# Patient Record
Sex: FEMALE | Race: BLACK OR AFRICAN AMERICAN | NOT HISPANIC OR LATINO | Employment: FULL TIME | URBAN - METROPOLITAN AREA
[De-identification: names, ages, dates, MRNs, and addresses within clinical notes are randomized per-mention and may not be internally consistent; named-entity substitution may affect disease eponyms.]

---

## 2017-04-08 ENCOUNTER — HOSPITAL ENCOUNTER (EMERGENCY)
Facility: HOSPITAL | Age: 27
Discharge: HOME | End: 2017-04-08
Attending: EMERGENCY MEDICINE
Payer: MEDICAID

## 2017-04-08 VITALS
SYSTOLIC BLOOD PRESSURE: 136 MMHG | DIASTOLIC BLOOD PRESSURE: 89 MMHG | BODY MASS INDEX: 43.4 KG/M2 | TEMPERATURE: 99 F | WEIGHT: 293 LBS | OXYGEN SATURATION: 99 % | HEIGHT: 69 IN | RESPIRATION RATE: 18 BRPM | HEART RATE: 80 BPM

## 2017-04-08 DIAGNOSIS — R07.9 CHEST PAIN: ICD-10-CM

## 2017-04-08 DIAGNOSIS — F41.9 ANXIETY: Primary | ICD-10-CM

## 2017-04-08 DIAGNOSIS — R06.02 SOB (SHORTNESS OF BREATH): ICD-10-CM

## 2017-04-08 LAB
ALBUMIN SERPL BCP-MCNC: 3.6 G/DL
ALP SERPL-CCNC: 70 U/L
ALT SERPL W/O P-5'-P-CCNC: 11 U/L
ANION GAP SERPL CALC-SCNC: 10 MMOL/L
AST SERPL-CCNC: 19 U/L
B-HCG UR QL: NEGATIVE
BASOPHILS # BLD AUTO: 0.01 K/UL
BASOPHILS NFR BLD: 0.1 %
BILIRUB SERPL-MCNC: 0.2 MG/DL
BNP SERPL-MCNC: <10 PG/ML
BUN SERPL-MCNC: 11 MG/DL
CALCIUM SERPL-MCNC: 8.6 MG/DL
CHLORIDE SERPL-SCNC: 106 MMOL/L
CO2 SERPL-SCNC: 23 MMOL/L
CREAT SERPL-MCNC: 0.8 MG/DL
CTP QC/QA: YES
D DIMER PPP IA.FEU-MCNC: 0.56 MG/L FEU
DIFFERENTIAL METHOD: ABNORMAL
EOSINOPHIL # BLD AUTO: 0 K/UL
EOSINOPHIL NFR BLD: 0.3 %
ERYTHROCYTE [DISTWIDTH] IN BLOOD BY AUTOMATED COUNT: 16.4 %
EST. GFR  (AFRICAN AMERICAN): >60 ML/MIN/1.73 M^2
EST. GFR  (NON AFRICAN AMERICAN): >60 ML/MIN/1.73 M^2
GLUCOSE SERPL-MCNC: 101 MG/DL
HCT VFR BLD AUTO: 37.1 %
HGB BLD-MCNC: 11.5 G/DL
LYMPHOCYTES # BLD AUTO: 4 K/UL
LYMPHOCYTES NFR BLD: 34.7 %
MCH RBC QN AUTO: 22.7 PG
MCHC RBC AUTO-ENTMCNC: 31 %
MCV RBC AUTO: 73 FL
MONOCYTES # BLD AUTO: 0.9 K/UL
MONOCYTES NFR BLD: 8.2 %
NEUTROPHILS # BLD AUTO: 6.5 K/UL
NEUTROPHILS NFR BLD: 56.7 %
PLATELET # BLD AUTO: 296 K/UL
PMV BLD AUTO: 10.7 FL
POTASSIUM SERPL-SCNC: 3.9 MMOL/L
PROT SERPL-MCNC: 8.1 G/DL
RBC # BLD AUTO: 5.07 M/UL
SODIUM SERPL-SCNC: 139 MMOL/L
TROPONIN I SERPL DL<=0.01 NG/ML-MCNC: 0.01 NG/ML
TSH SERPL DL<=0.005 MIU/L-ACNC: 1.92 UIU/ML
WBC # BLD AUTO: 11.44 K/UL

## 2017-04-08 PROCEDURE — 25500020 PHARM REV CODE 255: Performed by: EMERGENCY MEDICINE

## 2017-04-08 PROCEDURE — 80053 COMPREHEN METABOLIC PANEL: CPT

## 2017-04-08 PROCEDURE — 93005 ELECTROCARDIOGRAM TRACING: CPT

## 2017-04-08 PROCEDURE — 81025 URINE PREGNANCY TEST: CPT | Performed by: EMERGENCY MEDICINE

## 2017-04-08 PROCEDURE — 99284 EMERGENCY DEPT VISIT MOD MDM: CPT | Mod: 25

## 2017-04-08 PROCEDURE — 84484 ASSAY OF TROPONIN QUANT: CPT

## 2017-04-08 PROCEDURE — 96360 HYDRATION IV INFUSION INIT: CPT

## 2017-04-08 PROCEDURE — 85379 FIBRIN DEGRADATION QUANT: CPT

## 2017-04-08 PROCEDURE — 96361 HYDRATE IV INFUSION ADD-ON: CPT

## 2017-04-08 PROCEDURE — 84443 ASSAY THYROID STIM HORMONE: CPT

## 2017-04-08 PROCEDURE — 85025 COMPLETE CBC W/AUTO DIFF WBC: CPT

## 2017-04-08 PROCEDURE — 83880 ASSAY OF NATRIURETIC PEPTIDE: CPT

## 2017-04-08 PROCEDURE — 25000003 PHARM REV CODE 250: Performed by: EMERGENCY MEDICINE

## 2017-04-08 PROCEDURE — 82962 GLUCOSE BLOOD TEST: CPT

## 2017-04-08 RX ORDER — HYDROXYZINE HYDROCHLORIDE 25 MG/1
25 TABLET, FILM COATED ORAL EVERY 4 HOURS PRN
Qty: 30 TABLET | Refills: 0 | Status: SHIPPED | OUTPATIENT
Start: 2017-04-08 | End: 2017-07-19 | Stop reason: SDUPTHER

## 2017-04-08 RX ORDER — ALPRAZOLAM 0.5 MG/1
0.5 TABLET ORAL
Status: COMPLETED | OUTPATIENT
Start: 2017-04-08 | End: 2017-04-08

## 2017-04-08 RX ADMIN — IOHEXOL 70 ML: 350 INJECTION, SOLUTION INTRAVENOUS at 04:04

## 2017-04-08 RX ADMIN — ALPRAZOLAM 0.5 MG: 0.5 TABLET ORAL at 03:04

## 2017-04-08 RX ADMIN — SODIUM CHLORIDE 1000 ML: 0.9 INJECTION, SOLUTION INTRAVENOUS at 03:04

## 2017-04-08 NOTE — ED TRIAGE NOTES
"  27 year old female arrives to the ED via personal transportation due to SOB that started tonight. Pt states "Im anemic and I was walking up the stairs tonight and I became short winding and dizziness" Pt also c/o of upper middle back pain that's "been going on for a while now." nasal congestion and hot flashes. Back pain is a 10/10, "when I arch my back it starts hurting more and gives me more SOB. "When Im SOB I feel like I have a trapped gas pain on the left side of my breast between my arm."    "

## 2017-04-08 NOTE — ED PROVIDER NOTES
Encounter Date: 4/8/2017    SCRIBE #1 NOTE: I, Donnie Dennison II, am scribing for, and in the presence of,  Magdiel Nunes MD. I have scribed the following portions of the note - Other sections scribed: HPI and ROS.       History     Chief Complaint   Patient presents with    Shortness of Breath     Pt c/o sob tonight and heat flashes     Review of patient's allergies indicates:  No Known Allergies  HPI Comments: CC: Back Pain     HPI: This 27 y.o. female with dermatitis, pruritic disorder, anxiety, and GERD presents to the ED c/o acute onset, atraumatic, constant, severe (10/10) back pain with associated SOB that began Friday night when she attempted to go up a flight of stairs. She states as she walked up the stairs she began to experience SOB, dizzy, anxiety and hot flashes. She reports there was a trapped gas feeling in her chest as she went up the stairs. She states the back pain has been going on for months but worsened tonight going up the stairs. She states she also feels tightness and swelling in her neck. She notes her weight has been increasing. Pt states she had an ovarian cyst when she was younger. She also states she has diarrhea every morning.  Pt is compliant with Prilosec medication. No prior tx has been attempted. No similar episodes. Pt denies n/v, abdominal pain, and headache.    The history is provided by the patient. No  was used.     Past Medical History:   Diagnosis Date    Anxiety     Dermatitis     GERD (gastroesophageal reflux disease)     Pruritic disorder      No past surgical history on file.  Family History   Problem Relation Age of Onset    Hypertension Mother     Eczema Sister      Social History   Substance Use Topics    Smoking status: Never Smoker    Smokeless tobacco: Never Used    Alcohol use 0.6 oz/week     1 Glasses of wine per week     Review of Systems   Constitutional: Negative for chills, diaphoresis and fever.   HENT: Negative for ear pain  and sore throat.    Eyes:        (-) eye problems   Respiratory: Positive for shortness of breath. Negative for cough.    Cardiovascular: Negative for chest pain.   Gastrointestinal: Positive for diarrhea. Negative for abdominal pain, nausea and vomiting.   Genitourinary: Negative for dysuria.   Musculoskeletal: Positive for back pain and neck pain.        (-) arm or leg problems   Skin: Negative for rash.   Neurological: Negative for headaches.       Physical Exam   Initial Vitals   BP Pulse Resp Temp SpO2   04/08/17 0052 04/08/17 0052 04/08/17 0052 04/08/17 0052 04/08/17 0052   193/82 87 18 98.1 °F (36.7 °C) 100 %     Physical Exam    Nursing note and vitals reviewed.  Constitutional: She appears well-developed and well-nourished.   Eyes: EOM are normal. Pupils are equal, round, and reactive to light.   Neck: Normal range of motion. Neck supple. No thyromegaly present. No JVD present.   Cardiovascular: Normal rate, regular rhythm, normal heart sounds and intact distal pulses. Exam reveals no gallop and no friction rub.    No murmur heard.  Pulmonary/Chest: Breath sounds normal. No respiratory distress.   Abdominal: Soft. Bowel sounds are normal. She exhibits no distension. There is no tenderness. There is no rebound.   Musculoskeletal: Normal range of motion. She exhibits no edema or tenderness.   Neurological: She is alert and oriented to person, place, and time. She has normal strength.   Skin: Skin is warm and dry.   Psychiatric:   Patient appears anxious         ED Course   Procedures  Labs Reviewed   CBC W/ AUTO DIFFERENTIAL - Abnormal; Notable for the following:        Result Value    Hemoglobin 11.5 (*)     MCV 73 (*)     MCH 22.7 (*)     MCHC 31.0 (*)     RDW 16.4 (*)     All other components within normal limits   COMPREHENSIVE METABOLIC PANEL - Abnormal; Notable for the following:     Calcium 8.6 (*)     All other components within normal limits   D DIMER, QUANTITATIVE - Abnormal; Notable for the  following:     D-Dimer 0.56 (*)     All other components within normal limits   TSH   TROPONIN I   B-TYPE NATRIURETIC PEPTIDE   POCT URINE PREGNANCY     EKG Readings: (Independently Interpreted)   Initial Reading: No STEMI. Rhythm: Normal Sinus Rhythm. Heart Rate: 79. Ectopy: No Ectopy. ST Segments: Normal ST Segments. T Waves: Normal.       X-Rays:   Independently Interpreted Readings:   Chest X-Ray: Normal heart size.  No acute abnormalities.  No infiltrates.      No evidence of acute cardiopulmonary process. I feel anxiety is a component. Stable for discharge and followup closely with PCP for recheck. REturn for new or worsening symptoms.           Scribe Attestation:   Scribe #1: I performed the above scribed service and the documentation accurately describes the services I performed. I attest to the accuracy of the note.    Attending Attestation:           Physician Attestation for Scribe:  Physician Attestation Statement for Scribe #1: I, Magdiel Nunes MD, reviewed documentation, as scribed by Donnie Dennison II in my presence, and it is both accurate and complete.                 ED Course     Clinical Impression:   The primary encounter diagnosis was Anxiety. Diagnoses of Chest pain and SOB (shortness of breath) were also pertinent to this visit.          Magdiel Nunes MD  05/08/17 4502

## 2017-04-08 NOTE — ED AVS SNAPSHOT
[facility]  [place] [address]               Kingsmarcelina MAYA Velazco   2017  1:04 AM   ED    Description:  Female : 1990   Department:  [facility]           Your Care was Coordinated By:     Provider Role From To    Magdiel Nunes MD Attending Provider 17 0110 --      Reason for Visit     Shortness of Breath           Diagnoses this Visit        Comments    Anxiety    -  Primary     Chest pain           ED Disposition     None           To Do List           Follow-up Information     Follow up with Irvin Nur MD.    Specialty:  Family Medicine    Contact information:    [address] [facility] [id number] [phone]          Schedule an appointment as soon as possible for a visit with Williams Rogers MD.    Specialty:  Cardiovascular Disease    Why:  for further evalaution of chest pain    Contact information:    [address]  [address]  [facility]  [place] [id number] [phone]          Follow up with [facility].    Specialty:  Emergency Medicine    Why:  As needed    Contact information:    [place] [address] [phone]       These Medications        Disp Refills Start End    hydrOXYzine HCl (ATARAX) 25 MG tablet 30 tablet 0 2017     Take 1 tablet (25 mg total) by mouth every 4 (four) hours as needed for Anxiety. - Oral    Pharmacy: [id number] [place] [facility] Ph #: [phone]         [facility]     [facility] Nurse Care Line - 24/ Assistance  Unless otherwise directed by your provider, please contact [facility], our nurse care line that is available for 24/7 assistance.     Registered nurses in the [facility] provide: appointment scheduling, clinical advisement, health education, and other advisory services.  Call: [phone] (toll free)               Medications           Message regarding  Medications     Verify the changes and/or additions to your medication regime listed below are the same as discussed with your clinician today.  If any of these changes or additions are incorrect, please notify your healthcare provider.        START taking these NEW medications        Refills    hydrOXYzine HCl (ATARAX) 25 MG tablet 0    Sig: Take 1 tablet (25 mg total) by mouth every 4 (four) hours as needed for Anxiety.    Class: Print    Route: Oral      These medications were administered today        Dose Freq    sodium chloride 0.9% bolus 1,000 mL 1,000 mL ED 1 Time    Sig: Inject 1,000 mLs into the vein ED 1 Time.    Class: Normal    Route: Intravenous    alprazolam tablet 0.5 mg 0.5 mg ED 1 Time    Sig: Take 1 tablet (0.5 mg total) by mouth ED 1 Time.    Class: Normal    Route: Oral    omnipaque 350 iohexol 70 mL 70 mL IMG once as needed    Sig: Inject 70 mLs into the vein ONCE PRN for contrast.    Class: Normal    Route: Intravenous           Verify that the below list of medications is an accurate representation of the medications you are currently taking.  If none reported, the list may be blank. If incorrect, please contact your healthcare provider. Carry this list with you in case of emergency.           Current Medications     diphenhydrAMINE (BENADRYL) 25 mg capsule Take 2 each (50 mg total) by mouth nightly as needed for Insomnia.    docusate sodium (COLACE) 100 MG capsule Take 1 capsule (100 mg total) by mouth 2 (two) times daily as needed for Constipation.    ferrous sulfate 325 mg (65 mg iron) Tab tablet Take 1 tablet (325 mg total) by mouth once daily.    ibuprofen (ADVIL,MOTRIN) 600 MG tablet Take 600 mg by mouth 3 (three) times daily.    omeprazole (PRILOSEC) 40 MG capsule TAKE ONE CAPSULE BY MOUTH EVERY DAY.    acetaminophen-codeine 300-30mg (TYLENOL #3) 300-30 mg Tab Take 1 tablet by mouth every 6 (six) hours as needed (pain/cough).    albuterol 90 mcg/actuation inhaler Inhale 2 puffs into  "the lungs every 4 (four) hours as needed for Wheezing or Shortness of Breath (shortness of breath/wheezing).    amitriptyline (ELAVIL) 25 MG tablet Take 1 tablet (25 mg total) by mouth nightly as needed for Insomnia.    benzonatate (TESSALON) 200 MG capsule Take 1 capsule (200 mg total) by mouth 3 (three) times daily as needed for Cough.    citalopram (CELEXA) 10 MG tablet Take 1 tablet (10 mg total) by mouth once daily.    diazepam (VALIUM) 5 MG tablet Take 1 tablet (5 mg total) by mouth every 8 (eight) hours as needed for Anxiety.    hydrOXYzine HCl (ATARAX) 25 MG tablet Take 1 tablet (25 mg total) by mouth every 4 (four) hours as needed for Anxiety.    loratadine (CLARITIN) 10 mg tablet Take 1 tablet (10 mg total) by mouth once daily.           Clinical Reference Information           Your Vitals Were     BP Pulse Temp Resp Height Weight    147/80 72 99.2 °F (37.3 °C) (Oral) 25 5' 9" (1.753 m) 140.6 kg (310 lb)    SpO2 BMI             100% 45.78 kg/m2         Allergies as of 4/8/2017     No Known Allergies      Immunizations Administered on Date of Encounter - 4/8/2017     None      ED Micro, Lab, POCT     Start Ordered       Status Ordering Provider    04/08/17 0237 04/08/17 0237  CBC auto differential  STAT      Final result     04/08/17 0237 04/08/17 0237  Comprehensive metabolic panel  STAT      Final result     04/08/17 0237 04/08/17 0237  TSH  STAT      Final result     04/08/17 0237 04/08/17 0237  D dimer, quantitative  STAT      Final result     04/08/17 0237 04/08/17 0237  Troponin I  STAT      Final result     04/08/17 0237 04/08/17 0237  Brain natriuretic peptide  STAT      Final result     04/08/17 0217 04/08/17 0216  POCT urine pregnancy  Once      Final result       ED Imaging Orders     Start Ordered       Status Ordering Provider    04/08/17 0324 04/08/17 0323  CTA Chest Non-Coronary (PE Study)  1 time imaging      Final result     04/08/17 0237 04/08/17 0237  X-Ray Chest PA And Lateral  1 time " imaging      Final result       Discharge References/Attachments     CHEST PAIN, NONCARDIAC  (ENGLISH)      [email or web address] Sign-Up     Activating your [facility] account is as easy as 1-2-3!     1) Visit [email or web address], select Sign Up Now, enter this activation code and your date of birth, then select Next.  [email or web address]  Expires: 5/23/2017  4:47 AM      2) Create a username and password to use when you visit [facility] in the future and select a security question in case you lose your password and select Next.    3) Enter your e-mail address and click Sign Up!    Additional Information  If you have questions, please e-mail [email or web address] or call [phone] to talk to our [facility] staff. Remember, [facility] is NOT to be used for urgent needs. For medical emergencies, dial 911.          [facility] complies with applicable Federal civil rights laws and does not discriminate on the basis of race, color, national origin, age, disability, or sex.        Language Assistance Services     ATTENTION: Language assistance services are available, free of charge. Please call [phone].      ATENCIÓN: Si habla español, tiene a renee disposición servicios gratuitos de asistencia lingüística. Llame al [phone].     CHÚ Ý: N?u b?n nói Ti?ng Vi?t, có các d?ch v? h? tr? ngôn ng? mi?n phí dành cho b?n. G?i s? [phone].

## 2017-12-21 ENCOUNTER — HOSPITAL ENCOUNTER (EMERGENCY)
Facility: HOSPITAL | Age: 27
Discharge: HOME | End: 2017-12-21
Attending: EMERGENCY MEDICINE
Payer: MEDICAID

## 2017-12-21 VITALS
HEIGHT: 69 IN | HEART RATE: 118 BPM | WEIGHT: 293 LBS | OXYGEN SATURATION: 98 % | RESPIRATION RATE: 17 BRPM | DIASTOLIC BLOOD PRESSURE: 53 MMHG | SYSTOLIC BLOOD PRESSURE: 108 MMHG | TEMPERATURE: 99 F | BODY MASS INDEX: 43.4 KG/M2

## 2017-12-21 DIAGNOSIS — J02.0 ACUTE STREPTOCOCCAL PHARYNGITIS: ICD-10-CM

## 2017-12-21 DIAGNOSIS — R50.9 FEVER 106 DEGREES F OR OVER: Primary | ICD-10-CM

## 2017-12-21 LAB
B-HCG UR QL: NEGATIVE
CTP QC/QA: YES

## 2017-12-21 PROCEDURE — 63600175 PHARM REV CODE 636 W HCPCS: Performed by: EMERGENCY MEDICINE

## 2017-12-21 PROCEDURE — 96372 THER/PROPH/DIAG INJ SC/IM: CPT

## 2017-12-21 PROCEDURE — 96360 HYDRATION IV INFUSION INIT: CPT

## 2017-12-21 PROCEDURE — 81025 URINE PREGNANCY TEST: CPT | Performed by: EMERGENCY MEDICINE

## 2017-12-21 PROCEDURE — 99284 EMERGENCY DEPT VISIT MOD MDM: CPT | Mod: 25

## 2017-12-21 PROCEDURE — 25000003 PHARM REV CODE 250: Performed by: EMERGENCY MEDICINE

## 2017-12-21 RX ORDER — IBUPROFEN 400 MG/1
800 TABLET ORAL
Status: COMPLETED | OUTPATIENT
Start: 2017-12-21 | End: 2017-12-21

## 2017-12-21 RX ORDER — IBUPROFEN 600 MG/1
600 TABLET ORAL EVERY 6 HOURS PRN
Qty: 20 TABLET | Refills: 0 | Status: SHIPPED | OUTPATIENT
Start: 2017-12-21 | End: 2018-05-06 | Stop reason: ALTCHOICE

## 2017-12-21 RX ORDER — ACETAMINOPHEN 500 MG
1000 TABLET ORAL
Status: COMPLETED | OUTPATIENT
Start: 2017-12-21 | End: 2017-12-21

## 2017-12-21 RX ADMIN — PENICILLIN G BENZATHINE 1.2 MILLION UNITS: 1200000 INJECTION, SUSPENSION INTRAMUSCULAR at 07:12

## 2017-12-21 RX ADMIN — IBUPROFEN 800 MG: 400 TABLET, FILM COATED ORAL at 07:12

## 2017-12-21 RX ADMIN — SODIUM CHLORIDE 1000 ML: 0.9 INJECTION, SOLUTION INTRAVENOUS at 08:12

## 2017-12-21 RX ADMIN — ACETAMINOPHEN 1000 MG: 500 TABLET ORAL at 07:12

## 2017-12-21 NOTE — ED PROVIDER NOTES
"Encounter Date: 12/21/2017    SCRIBE #1 NOTE: I, Donnie Dennison MARIO, am scribing for, and in the presence of,  Kishore Lisa MD. I have scribed the following portions of the note - Other sections scribed: HPI and ROS.       History     Chief Complaint   Patient presents with    Flu-like symptoms     fever, sore throat, headache, chills, body aches, denies taking medication today for symptoms, reports symptoms started yesterday     CC: Flu-like symptoms     HPI: This 27 y.o. female with dermatitis, pruritic disorder, anxiety, and GERD presents to the ED c/o acute onset, flu-like symptoms with sore throat, fever (106 F), headache, right ear pain, diarrhea, rhinorrhea, and chills x2 days. Per medical record pt visited PCP on 12/5 c/o similar flu-like symptoms. Pt reports taking OTC medication with minimal alleviation. No alleviating or exacerbating factors. Pt describes the episode as, " I feel bad, I'm really sick." Pt denies nausea, vomiting, and chest pain.       The history is provided by the patient. No  was used.     Review of patient's allergies indicates:  No Known Allergies  Past Medical History:   Diagnosis Date    Anxiety     Dermatitis     GERD (gastroesophageal reflux disease)     Pruritic disorder      No past surgical history on file.  Family History   Problem Relation Age of Onset    Hypertension Mother     Eczema Sister      Social History   Substance Use Topics    Smoking status: Never Smoker    Smokeless tobacco: Never Used    Alcohol use 0.6 oz/week     1 Glasses of wine per week     Review of Systems   Constitutional: Positive for fever. Negative for chills.   HENT: Positive for ear pain, rhinorrhea and sore throat. Negative for congestion.    Eyes: Negative for pain and visual disturbance.   Respiratory: Negative for cough and shortness of breath.    Cardiovascular: Negative for chest pain.   Gastrointestinal: Positive for diarrhea. Negative for abdominal pain, nausea " and vomiting.   Genitourinary: Negative for dysuria.   Musculoskeletal: Negative for back pain and neck pain.   Skin: Negative for rash.   Neurological: Positive for headaches.       Physical Exam     Initial Vitals [12/21/17 0602]   BP Pulse Resp Temp SpO2   134/64 (!) 154 (!) 26 (!) 106 °F (41.1 °C) 98 %      MAP       87.33         Physical Exam  The patient was examined specifically for the following:   General:No significant distress, Good color, Warm and dry. Head and neck:Scalp atraumatic, Neck supple. Neurological:Appropriate conversation, Gross motor deficits. Eyes:Conjugate gaze, Clear corneas. ENT: No epistaxis. Cardiac: Regular rate and rhythm, Grossly normal heart tones. Pulmonary: Wheezing, Rales. Gastrointestinal: Abdominal tenderness, Abdominal distention. Musculoskeletal: Extremity deformity, Apparent pain with range of motion of the joints. Skin: Rash.   The findings on examination were normal except for the following: The patient has exudates on her tonsils.  Her temperatures 106°.  The neck supple.  Lungs are clear.  The patient is awake alert bright.  The heart rate is under 54.  The patient's blood pressures 134/64.  The lungs are clear.  The heart tones remarkable for regular tachycardia.  The abdomen is soft.  The patient is obese.  ED Course   Procedures  Labs Reviewed   POCT URINE PREGNANCY         Medical decision making: Given the above, this patient presented emergency room with a high fever tachycardia sore throat.  She has exudates on her right tonsil I believe she has streptococcal pharyngitis.  She was treated with IM penicillin in the emergency room.  We will treated with IV fluid.  She was treated with ibuprofen and Tylenol.  She had a temperature 106°.  She is not pregnant.  I will discharge her to outpatient evaluation and treatment lots of liquids regular ibuprofen and Tylenol.  I will have her return if she gets worse or if new problems develop.  I will have her follow-up with  primary care.                  Scribe Attestation:   Scribe #1: I performed the above scribed service and the documentation accurately describes the services I performed. I attest to the accuracy of the note.    Attending Attestation:           Physician Attestation for Scribe:  Physician Attestation Statement for Scribe #1: I, Kishore Lisa MD, reviewed documentation, as scribed by Donnie Dennison II in my presence, and it is both accurate and complete.                 ED Course      Clinical Impression:   The primary encounter diagnosis was Fever 106 degrees F or over. A diagnosis of Acute streptococcal pharyngitis was also pertinent to this visit.                           Kishore Lisa MD  12/21/17 1660

## 2017-12-21 NOTE — ED TRIAGE NOTES
Pt reports to ED via EMS with c/o sore throat, fever, chills, head ache, diarrhea, and body aches starting yesterday; pt reports that she did not take her temperature at home but she felt hot; pt reports 4-5 episodes of diarrhea; pt skin hot and pt tachycardic HR in the 140s-150s; pt reports that she took Cloraseden and Nyquil last night with some relief; pt AAOx4;

## 2017-12-21 NOTE — DISCHARGE INSTRUCTIONS
Lots of liquids.  Please return immediately if you get worse or if new problems develop.  Please make an appointment to see your primary care doctor this week.  Rest.  Light clothing.  Please take the ibuprofen every 6 hours for fever.  You may also use, at the same time, Tylenol 650 mg, by mouth, every 6 hours as needed for fever.  Lots of liquids.

## 2017-12-21 NOTE — ED NOTES
Pt had bowel movement in bed, loose brown stool noted, Pt was provided with perineal cleaning, clean sheets, gown and green pad placed.

## 2018-05-06 ENCOUNTER — HOSPITAL ENCOUNTER (EMERGENCY)
Facility: HOSPITAL | Age: 28
Discharge: HOME | End: 2018-05-06
Attending: EMERGENCY MEDICINE
Payer: MEDICAID

## 2018-05-06 VITALS
HEART RATE: 94 BPM | HEIGHT: 69 IN | RESPIRATION RATE: 20 BRPM | WEIGHT: 293 LBS | SYSTOLIC BLOOD PRESSURE: 146 MMHG | DIASTOLIC BLOOD PRESSURE: 88 MMHG | BODY MASS INDEX: 43.4 KG/M2 | TEMPERATURE: 99 F | OXYGEN SATURATION: 98 %

## 2018-05-06 DIAGNOSIS — R09.89 ABNORMAL LUNG SOUNDS: ICD-10-CM

## 2018-05-06 DIAGNOSIS — J02.9 VIRAL PHARYNGITIS: Primary | ICD-10-CM

## 2018-05-06 LAB
ALBUMIN SERPL BCP-MCNC: 3.7 G/DL
ALP SERPL-CCNC: 87 U/L
ALT SERPL W/O P-5'-P-CCNC: 18 U/L
AMORPH CRY URNS QL MICRO: ABNORMAL
ANION GAP SERPL CALC-SCNC: 8 MMOL/L
AST SERPL-CCNC: 21 U/L
B-HCG UR QL: NEGATIVE
BACTERIA #/AREA URNS HPF: ABNORMAL /HPF
BASOPHILS # BLD AUTO: 0.01 K/UL
BASOPHILS NFR BLD: 0.1 %
BILIRUB SERPL-MCNC: 0.5 MG/DL
BILIRUB UR QL STRIP: NEGATIVE
BUN SERPL-MCNC: 10 MG/DL
CALCIUM SERPL-MCNC: 8.9 MG/DL
CHLORIDE SERPL-SCNC: 103 MMOL/L
CLARITY UR: ABNORMAL
CO2 SERPL-SCNC: 26 MMOL/L
COLOR UR: YELLOW
CREAT SERPL-MCNC: 0.9 MG/DL
CTP QC/QA: YES
DEPRECATED S PYO AG THROAT QL EIA: NEGATIVE
DIFFERENTIAL METHOD: ABNORMAL
EOSINOPHIL # BLD AUTO: 0 K/UL
EOSINOPHIL NFR BLD: 0 %
ERYTHROCYTE [DISTWIDTH] IN BLOOD BY AUTOMATED COUNT: 14.8 %
EST. GFR  (AFRICAN AMERICAN): >60 ML/MIN/1.73 M^2
EST. GFR  (NON AFRICAN AMERICAN): >60 ML/MIN/1.73 M^2
GLUCOSE SERPL-MCNC: 89 MG/DL
GLUCOSE UR QL STRIP: NEGATIVE
HCT VFR BLD AUTO: 37.7 %
HGB BLD-MCNC: 12.1 G/DL
HGB UR QL STRIP: ABNORMAL
KETONES UR QL STRIP: NEGATIVE
LEUKOCYTE ESTERASE UR QL STRIP: ABNORMAL
LYMPHOCYTES # BLD AUTO: 1.7 K/UL
LYMPHOCYTES NFR BLD: 9.7 %
MCH RBC QN AUTO: 25.4 PG
MCHC RBC AUTO-ENTMCNC: 32.1 G/DL
MCV RBC AUTO: 79 FL
MICROSCOPIC COMMENT: ABNORMAL
MONOCYTES # BLD AUTO: 0.8 K/UL
MONOCYTES NFR BLD: 4.8 %
NEUTROPHILS # BLD AUTO: 14.8 K/UL
NEUTROPHILS NFR BLD: 85.4 %
NITRITE UR QL STRIP: NEGATIVE
PH UR STRIP: 7 [PH] (ref 5–8)
PLATELET # BLD AUTO: 274 K/UL
PMV BLD AUTO: 10.5 FL
POTASSIUM SERPL-SCNC: 4.2 MMOL/L
PROT SERPL-MCNC: 8.1 G/DL
PROT UR QL STRIP: NEGATIVE
RBC # BLD AUTO: 4.77 M/UL
RBC #/AREA URNS HPF: 7 /HPF (ref 0–4)
SODIUM SERPL-SCNC: 137 MMOL/L
SP GR UR STRIP: 1.03 (ref 1–1.03)
URN SPEC COLLECT METH UR: ABNORMAL
UROBILINOGEN UR STRIP-ACNC: ABNORMAL EU/DL
WBC # BLD AUTO: 17.28 K/UL
WBC #/AREA URNS HPF: 2 /HPF (ref 0–5)

## 2018-05-06 PROCEDURE — 85025 COMPLETE CBC W/AUTO DIFF WBC: CPT

## 2018-05-06 PROCEDURE — 87147 CULTURE TYPE IMMUNOLOGIC: CPT | Mod: 59

## 2018-05-06 PROCEDURE — 25000003 PHARM REV CODE 250: Performed by: NURSE PRACTITIONER

## 2018-05-06 PROCEDURE — 25500020 PHARM REV CODE 255: Performed by: EMERGENCY MEDICINE

## 2018-05-06 PROCEDURE — 81000 URINALYSIS NONAUTO W/SCOPE: CPT

## 2018-05-06 PROCEDURE — 99285 EMERGENCY DEPT VISIT HI MDM: CPT | Mod: 25

## 2018-05-06 PROCEDURE — 96361 HYDRATE IV INFUSION ADD-ON: CPT

## 2018-05-06 PROCEDURE — 81025 URINE PREGNANCY TEST: CPT | Performed by: NURSE PRACTITIONER

## 2018-05-06 PROCEDURE — 87081 CULTURE SCREEN ONLY: CPT

## 2018-05-06 PROCEDURE — 96375 TX/PRO/DX INJ NEW DRUG ADDON: CPT

## 2018-05-06 PROCEDURE — 96374 THER/PROPH/DIAG INJ IV PUSH: CPT | Mod: 59

## 2018-05-06 PROCEDURE — 63600175 PHARM REV CODE 636 W HCPCS: Performed by: NURSE PRACTITIONER

## 2018-05-06 PROCEDURE — 87880 STREP A ASSAY W/OPTIC: CPT | Mod: 59

## 2018-05-06 PROCEDURE — 80053 COMPREHEN METABOLIC PANEL: CPT

## 2018-05-06 RX ORDER — ONDANSETRON 2 MG/ML
4 INJECTION INTRAMUSCULAR; INTRAVENOUS
Status: COMPLETED | OUTPATIENT
Start: 2018-05-06 | End: 2018-05-06

## 2018-05-06 RX ORDER — IBUPROFEN 400 MG/1
400 TABLET ORAL EVERY 6 HOURS PRN
Qty: 20 TABLET | Refills: 0 | OUTPATIENT
Start: 2018-05-06 | End: 2018-12-10

## 2018-05-06 RX ORDER — AMOXICILLIN 250 MG/1
500 CAPSULE ORAL
Status: COMPLETED | OUTPATIENT
Start: 2018-05-06 | End: 2018-05-06

## 2018-05-06 RX ORDER — FLUTICASONE PROPIONATE 50 MCG
1 SPRAY, SUSPENSION (ML) NASAL 2 TIMES DAILY PRN
Qty: 15 G | Refills: 0 | Status: SHIPPED | OUTPATIENT
Start: 2018-05-06 | End: 2019-02-04 | Stop reason: SDUPTHER

## 2018-05-06 RX ORDER — AMOXICILLIN 500 MG/1
500 CAPSULE ORAL EVERY 12 HOURS
Qty: 20 CAPSULE | Refills: 0 | Status: SHIPPED | OUTPATIENT
Start: 2018-05-06 | End: 2018-05-16

## 2018-05-06 RX ORDER — HYDROMORPHONE HYDROCHLORIDE 2 MG/ML
0.5 INJECTION, SOLUTION INTRAMUSCULAR; INTRAVENOUS; SUBCUTANEOUS
Status: COMPLETED | OUTPATIENT
Start: 2018-05-06 | End: 2018-05-06

## 2018-05-06 RX ORDER — ONDANSETRON 4 MG/1
4 TABLET, FILM COATED ORAL EVERY 8 HOURS PRN
Qty: 12 TABLET | Refills: 0 | OUTPATIENT
Start: 2018-05-06 | End: 2018-12-10

## 2018-05-06 RX ORDER — FLUCONAZOLE 150 MG/1
150 TABLET ORAL DAILY
Qty: 1 TABLET | Refills: 0 | Status: SHIPPED | OUTPATIENT
Start: 2018-05-06 | End: 2018-05-07

## 2018-05-06 RX ORDER — DEXAMETHASONE SODIUM PHOSPHATE 100 MG/10ML
10 INJECTION INTRAMUSCULAR; INTRAVENOUS
Status: DISCONTINUED | OUTPATIENT
Start: 2018-05-06 | End: 2018-05-06

## 2018-05-06 RX ORDER — DEXAMETHASONE SODIUM PHOSPHATE 100 MG/10ML
8 INJECTION INTRAMUSCULAR; INTRAVENOUS
Status: COMPLETED | OUTPATIENT
Start: 2018-05-06 | End: 2018-05-06

## 2018-05-06 RX ADMIN — SODIUM CHLORIDE 1000 ML: 0.9 INJECTION, SOLUTION INTRAVENOUS at 02:05

## 2018-05-06 RX ADMIN — DEXAMETHASONE SODIUM PHOSPHATE 8 MG: 10 INJECTION INTRAMUSCULAR; INTRAVENOUS at 04:05

## 2018-05-06 RX ADMIN — ONDANSETRON 4 MG: 2 INJECTION INTRAMUSCULAR; INTRAVENOUS at 02:05

## 2018-05-06 RX ADMIN — AMOXICILLIN 500 MG: 250 CAPSULE ORAL at 04:05

## 2018-05-06 RX ADMIN — HYDROMORPHONE HYDROCHLORIDE 0.5 MG: 2 INJECTION INTRAMUSCULAR; INTRAVENOUS; SUBCUTANEOUS at 02:05

## 2018-05-06 RX ADMIN — IOHEXOL 100 ML: 350 INJECTION, SOLUTION INTRAVENOUS at 04:05

## 2018-05-06 NOTE — DISCHARGE INSTRUCTIONS
Alternate Tylenol and advil every 3 hours for fever/body aches. Cepacol lozenges, warm fluids to drink, and warm salt water gargles for sore throat. Flonase for congestion and runny nose. Return to the Emergency department for any worsening or failure to improve, otherwise follow up with your primary care provider.

## 2018-05-06 NOTE — ED NOTES
Pt. States that her mother will come and pick her up. Pt. Made aware of ED policy on narc admit and verbalized understanding.

## 2018-05-06 NOTE — ED TRIAGE NOTES
Pt c/o chills, headache, sore throat, weakness, body aches, nausea, abdominal pain (onset last night). Denies vomiting/diarhea. Pt took Tylenol around 1100

## 2018-05-06 NOTE — ED NOTES
Dr. Dee advises she is aware of his shellfish allergy. Pt states he has been fine with contrast in the past.

## 2018-05-07 NOTE — ED PROVIDER NOTES
"Encounter Date: 5/6/2018       History     Chief Complaint   Patient presents with    Headache     " having fever chills, headache and sore throat" symptoms since last night     Chief complaint:  Headache    History of present illness:  Patient is a 28-year-old female who reports that she has had a headache with associated subjective fever, chills, congestion, sore throat, left ear pain, shortness of breath, body aches and abdominal pain, nausea.  She reports having taken Tylenol at 11:00 this morning without relief of symptoms.  Current severity pain is 10/10.  She denies cough, diarrhea, constipation, vomiting, rash, sinus pressure or runny nose.      The history is provided by the patient. No  was used.     Review of patient's allergies indicates:  No Known Allergies  Past Medical History:   Diagnosis Date    Anxiety     Dermatitis     GERD (gastroesophageal reflux disease)     Pruritic disorder      History reviewed. No pertinent surgical history.  Family History   Problem Relation Age of Onset    Hypertension Mother     Eczema Sister      Social History   Substance Use Topics    Smoking status: Never Smoker    Smokeless tobacco: Never Used    Alcohol use Yes      Comment: occ     Review of Systems   Constitutional: Positive for chills. Negative for fatigue and fever.   HENT: Positive for congestion, ear pain and sore throat. Negative for ear discharge, postnasal drip, rhinorrhea, sinus pressure, sneezing and voice change.    Eyes: Negative for discharge and itching.   Respiratory: Positive for shortness of breath. Negative for cough and wheezing.    Cardiovascular: Negative for chest pain, palpitations and leg swelling.   Gastrointestinal: Positive for abdominal pain. Negative for constipation, diarrhea, nausea and vomiting.   Endocrine: Negative for polydipsia, polyphagia and polyuria.   Genitourinary: Negative for dysuria, frequency, hematuria, urgency, vaginal bleeding, vaginal " discharge and vaginal pain.   Musculoskeletal: Positive for myalgias. Negative for arthralgias.   Skin: Negative for rash and wound.   Neurological: Positive for headaches. Negative for dizziness, seizures, syncope, weakness and numbness.   Hematological: Negative for adenopathy. Does not bruise/bleed easily.   Psychiatric/Behavioral: Negative for self-injury and suicidal ideas. The patient is not nervous/anxious.        Physical Exam     Initial Vitals [05/06/18 1247]   BP Pulse Resp Temp SpO2   (!) 143/77 100 20 99.3 °F (37.4 °C) 98 %      MAP       99         Physical Exam    Nursing note and vitals reviewed.  Constitutional: She appears well-developed and well-nourished.   HENT:   Head: Normocephalic and atraumatic.   Right Ear: Hearing, tympanic membrane, external ear and ear canal normal.   Left Ear: Hearing, external ear and ear canal normal. Tympanic membrane is erythematous.   Nose: Nose normal. No epistaxis.   Mouth/Throat: Posterior oropharyngeal edema and posterior oropharyngeal erythema present.   Eyes: Conjunctivae and EOM are normal. Pupils are equal, round, and reactive to light. Right eye exhibits no discharge. Left eye exhibits no discharge.   Neck: Normal range of motion.   Cardiovascular: Regular rhythm, S1 normal, S2 normal and normal heart sounds. Exam reveals no gallop.    No murmur heard.  Pulmonary/Chest: Effort normal and breath sounds normal. No respiratory distress. She has no decreased breath sounds. She has no wheezes. She has no rhonchi. She has no rales.   Abdominal: Soft. Normal appearance and bowel sounds are normal. She exhibits no distension. There is no tenderness.   Musculoskeletal: Normal range of motion.   Neurological: She is alert and oriented to person, place, and time.   Skin: Skin is dry. Capillary refill takes less than 2 seconds.         ED Course   Procedures  Labs Reviewed   CBC W/ AUTO DIFFERENTIAL - Abnormal; Notable for the following:        Result Value    WBC  17.28 (*)     MCV 79 (*)     MCH 25.4 (*)     RDW 14.8 (*)     Gran # (ANC) 14.8 (*)     Gran% 85.4 (*)     Lymph% 9.7 (*)     All other components within normal limits   URINALYSIS, REFLEX TO URINE CULTURE - Abnormal; Notable for the following:     Appearance, UA Hazy (*)     Occult Blood UA 2+ (*)     Urobilinogen, UA 2.0-3.0 (*)     Leukocytes, UA Trace (*)     All other components within normal limits   URINALYSIS MICROSCOPIC - Abnormal; Notable for the following:     RBC, UA 7 (*)     Bacteria, UA Few (*)     All other components within normal limits   THROAT SCREEN, RAPID   CULTURE, STREP A,  THROAT   COMPREHENSIVE METABOLIC PANEL   POCT URINE PREGNANCY                   APC / Resident Notes:   Patient is a 28-year-old female who presents with upper respiratory symptoms including chills, headache, congestion, sore throat, ear pain, shortness of breath, myalgias, abdominal pain with nausea.  She denies fever, cough, diarrhea, constipation.  Tylenol was ineffective at relieving her pain. She rates her pain as a 10/10.    Physical examination reveals an atraumatic, afebrile, nontoxic appearing 28-year-old female in no apparent distress. Physical examination the HEENT revealed a reddened left tympanic membrane.  Right TM was normal.  Throat was mildly erythematous without exudate. Lymph nodes were not present in the cervical, submandibular region.  Bilateral breath sounds clear to auscultation, regular rate and rhythm no murmurs clicks or rubs. Skin was warm dry intact. Abdominal examination, normal bowel sounds x4 quadrants, no tenderness.    See below for radiologic and laboratory interpretation.    Patient was given amoxicillin 500 mg in the emergency department she will continue taking this p.o. b.i.d. times 10 days for left otitis media, pharyngitis.  She was given dexamethasone 8 mg IV, normal saline 1 L IV, Dilaudid 0.5 mg IV, Zofran 4 mg IV.  This relieved her pain and her nausea.    Patient was discharged  home in good condition with prescription for Zofran 4 mg p.o. q.8 hours p.r.n. nausea, Amoxil 500 mg p.o. b.i.d., ibuprofen 40 mg p.o. q.6 hours p.r.n., Flonase, Diflucan 150 mg p.o. x1 tablet.    Patient was seen she should follow up with primary care provider as soon as possible.    Care of the patient discussed with Dr. polk who agreed with the assessment and plan.                ED Course as of May 06 2207   Sun May 06, 2018   1401 Preg Test, Ur: Negative [VC]   1401 Rapid Strep A Screen: Negative [VC]   1553 CBC: leukocyte count was high, the H&H was normal. The platelet count was normal. This indicates possible infection.    The chemistry was negative for hypo-or hyper natremia, kalemia, chloridemia, or other electrolyte abnormalities; BUN and creatinine were within normal limits indicating normal kidney function, ALT and AST were within normal limits indicating normal liver function, there was no transaminitis.          [VC]   1554 1. Mildly coarse interstitial attenuation, likely accentuated by habitus.  No large focal consolidation or acute cardiopulmonary process. X-Ray Chest PA And Lateral [VC]   1613 UA with 2wbc/7rbc/hpf.  Will culture.  [VC]   1623 No acute findings.   CT Abdomen Pelvis With Contrast [VC]      ED Course User Index  [VC] Filiberto Levi DNP     Clinical Impression:   The primary encounter diagnosis was Viral pharyngitis. A diagnosis of Abnormal lung sounds was also pertinent to this visit.    Disposition:   Disposition: Discharged  Condition: Stable                        Filiberto Levi DNP  05/06/18 1213

## 2018-05-08 LAB — BACTERIA THROAT CULT: NORMAL

## 2018-09-21 ENCOUNTER — HOSPITAL ENCOUNTER (EMERGENCY)
Facility: HOSPITAL | Age: 28
Discharge: HOME | End: 2018-09-21
Attending: EMERGENCY MEDICINE
Payer: MEDICAID

## 2018-09-21 VITALS
RESPIRATION RATE: 16 BRPM | SYSTOLIC BLOOD PRESSURE: 177 MMHG | OXYGEN SATURATION: 100 % | WEIGHT: 293 LBS | DIASTOLIC BLOOD PRESSURE: 88 MMHG | BODY MASS INDEX: 43.4 KG/M2 | HEART RATE: 60 BPM | TEMPERATURE: 98 F | HEIGHT: 69 IN

## 2018-09-21 DIAGNOSIS — R06.02 SHORTNESS OF BREATH: ICD-10-CM

## 2018-09-21 DIAGNOSIS — I10 HYPERTENSION, UNSPECIFIED TYPE: ICD-10-CM

## 2018-09-21 DIAGNOSIS — R07.9 CHEST PAIN, UNSPECIFIED TYPE: Primary | ICD-10-CM

## 2018-09-21 DIAGNOSIS — R03.0 ELEVATED BLOOD PRESSURE READING: ICD-10-CM

## 2018-09-21 LAB
ALBUMIN SERPL BCP-MCNC: 3.5 G/DL
ALP SERPL-CCNC: 75 U/L
ALT SERPL W/O P-5'-P-CCNC: 31 U/L
AMPHET+METHAMPHET UR QL: NEGATIVE
ANION GAP SERPL CALC-SCNC: 14 MMOL/L
AST SERPL-CCNC: 31 U/L
B-HCG UR QL: NEGATIVE
BACTERIA #/AREA URNS HPF: NORMAL /HPF
BARBITURATES UR QL SCN>200 NG/ML: NEGATIVE
BASOPHILS # BLD AUTO: 0.01 K/UL
BASOPHILS NFR BLD: 0.1 %
BENZODIAZ UR QL SCN>200 NG/ML: NEGATIVE
BILIRUB SERPL-MCNC: 0.4 MG/DL
BILIRUB UR QL STRIP: NEGATIVE
BNP SERPL-MCNC: 11 PG/ML
BUN SERPL-MCNC: 13 MG/DL
BZE UR QL SCN: NEGATIVE
CALCIUM SERPL-MCNC: 9.2 MG/DL
CANNABINOIDS UR QL SCN: NORMAL
CHLORIDE SERPL-SCNC: 104 MMOL/L
CLARITY UR: ABNORMAL
CO2 SERPL-SCNC: 20 MMOL/L
COLOR UR: YELLOW
CREAT SERPL-MCNC: 0.8 MG/DL
CREAT UR-MCNC: 118.6 MG/DL
CTP QC/QA: YES
D DIMER PPP IA.FEU-MCNC: 0.39 MG/L FEU
DIFFERENTIAL METHOD: ABNORMAL
EOSINOPHIL # BLD AUTO: 0.1 K/UL
EOSINOPHIL NFR BLD: 0.6 %
ERYTHROCYTE [DISTWIDTH] IN BLOOD BY AUTOMATED COUNT: 14.9 %
EST. GFR  (AFRICAN AMERICAN): >60 ML/MIN/1.73 M^2
EST. GFR  (NON AFRICAN AMERICAN): >60 ML/MIN/1.73 M^2
GLUCOSE SERPL-MCNC: 87 MG/DL
GLUCOSE UR QL STRIP: NEGATIVE
HCT VFR BLD AUTO: 36.5 %
HGB BLD-MCNC: 11.8 G/DL
HGB UR QL STRIP: ABNORMAL
KETONES UR QL STRIP: NEGATIVE
LEUKOCYTE ESTERASE UR QL STRIP: ABNORMAL
LYMPHOCYTES # BLD AUTO: 3.7 K/UL
LYMPHOCYTES NFR BLD: 46.9 %
MCH RBC QN AUTO: 25.4 PG
MCHC RBC AUTO-ENTMCNC: 32.3 G/DL
MCV RBC AUTO: 79 FL
METHADONE UR QL SCN>300 NG/ML: NEGATIVE
MICROSCOPIC COMMENT: NORMAL
MONOCYTES # BLD AUTO: 0.6 K/UL
MONOCYTES NFR BLD: 7.5 %
NEUTROPHILS # BLD AUTO: 3.6 K/UL
NEUTROPHILS NFR BLD: 44.9 %
NITRITE UR QL STRIP: NEGATIVE
OPIATES UR QL SCN: NEGATIVE
PCP UR QL SCN>25 NG/ML: NEGATIVE
PH UR STRIP: 5 [PH] (ref 5–8)
PLATELET # BLD AUTO: 244 K/UL
PMV BLD AUTO: 10.4 FL
POTASSIUM SERPL-SCNC: 4.3 MMOL/L
PROT SERPL-MCNC: 7.8 G/DL
PROT UR QL STRIP: NEGATIVE
RBC # BLD AUTO: 4.64 M/UL
RBC #/AREA URNS HPF: 2 /HPF (ref 0–4)
SODIUM SERPL-SCNC: 138 MMOL/L
SP GR UR STRIP: 1.01 (ref 1–1.03)
SQUAMOUS #/AREA URNS HPF: 20 /HPF
TOXICOLOGY INFORMATION: NORMAL
TROPONIN I SERPL DL<=0.01 NG/ML-MCNC: <0.006 NG/ML
URN SPEC COLLECT METH UR: ABNORMAL
UROBILINOGEN UR STRIP-ACNC: NEGATIVE EU/DL
WBC # BLD AUTO: 7.91 K/UL
WBC #/AREA URNS HPF: 4 /HPF (ref 0–5)

## 2018-09-21 PROCEDURE — 85025 COMPLETE CBC W/AUTO DIFF WBC: CPT

## 2018-09-21 PROCEDURE — 80053 COMPREHEN METABOLIC PANEL: CPT

## 2018-09-21 PROCEDURE — 81000 URINALYSIS NONAUTO W/SCOPE: CPT | Mod: 59

## 2018-09-21 PROCEDURE — 93005 ELECTROCARDIOGRAM TRACING: CPT

## 2018-09-21 PROCEDURE — 99284 EMERGENCY DEPT VISIT MOD MDM: CPT

## 2018-09-21 PROCEDURE — 93010 ELECTROCARDIOGRAM REPORT: CPT | Mod: ,,, | Performed by: INTERNAL MEDICINE

## 2018-09-21 PROCEDURE — 85379 FIBRIN DEGRADATION QUANT: CPT

## 2018-09-21 PROCEDURE — 83880 ASSAY OF NATRIURETIC PEPTIDE: CPT

## 2018-09-21 PROCEDURE — 80307 DRUG TEST PRSMV CHEM ANLYZR: CPT

## 2018-09-21 PROCEDURE — 84484 ASSAY OF TROPONIN QUANT: CPT

## 2018-09-21 PROCEDURE — 81025 URINE PREGNANCY TEST: CPT | Performed by: EMERGENCY MEDICINE

## 2018-09-21 RX ORDER — HYDROXYZINE HYDROCHLORIDE 50 MG/1
50 TABLET, FILM COATED ORAL 4 TIMES DAILY PRN
Qty: 20 TABLET | Refills: 2 | OUTPATIENT
Start: 2018-09-21 | End: 2018-12-10

## 2018-09-21 RX ORDER — AMLODIPINE BESYLATE 5 MG/1
5 TABLET ORAL DAILY
Qty: 30 TABLET | Refills: 3 | Status: SHIPPED | OUTPATIENT
Start: 2018-09-21 | End: 2019-09-21

## 2018-09-21 NOTE — ED PROVIDER NOTES
"Encounter Date: 9/21/2018    SCRIBE #1 NOTE: I, Margo Moreau, am scribing for, and in the presence of,  Melisa Luo MD. I have scribed the following portions of the note - Other sections scribed: HPI, ROS, PE.       History     Chief Complaint   Patient presents with    Shortness of Breath     pt c/o SOB and chest tightness starting 3:00 pm yesterday worsening this morning .     CC: Shortness of Breath; Chest Pain    HPI: This 29 yo female presents to the ED for emergent evaluation of acute onset mid-sternal chest pain that radiates slightly to the right with associated SOB, blurred vision, and dizziness that began at work around 3 PM yesterday. Pt rates the pain as severe (10/10), constant, and sharp. 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 Pt reports eating "barbecue chicken and macaroni" for lunch, as did her boyfriend, but he does not have these symptoms. Denies vomiting, diarrhea, rhinorrhea, sore throat, headache, back pain, abdominal pain, dysuria, or hematuria.    Pt's LMP was last week. Pt states that she takes Omeprazole and Hydroxyzine (took around 7/8 PM last night), when asked.     PMHx of Anxiety, Dermatitis, GERD, and Pruritic disorder       The history is provided by the patient. No  was used.     Review of patient's allergies indicates:  No Known Allergies  Past Medical History:   Diagnosis Date    Anxiety     Dermatitis     GERD (gastroesophageal reflux disease)     Pruritic disorder      History reviewed. No pertinent surgical history.  Family History   Problem Relation Age of Onset    Hypertension Mother     Eczema Sister      Social History     Tobacco Use    Smoking status: Never Smoker    Smokeless tobacco: Never Used   Substance Use Topics    Alcohol use: Yes     Comment: occ    Drug use: Yes     Types: Marijuana     Comment: occ     Review of Systems   Constitutional: Negative for appetite change " and fever.   HENT: Negative for rhinorrhea.    Eyes: Positive for visual disturbance (blurred vision).   Respiratory: Positive for shortness of breath. Negative for cough.    Cardiovascular: Positive for chest pain (mid-sternal radiates to R side).   Gastrointestinal: Negative for abdominal pain, diarrhea, nausea and vomiting.   Genitourinary: Negative for dysuria and hematuria.   Musculoskeletal: Negative for back pain, gait problem and neck stiffness.   Skin: Negative for rash.   Neurological: Positive for dizziness. Negative for syncope and headaches.       Physical Exam     Initial Vitals [09/21/18 0226]   BP Pulse Resp Temp SpO2   (!) 173/81 64 20 97.8 °F (36.6 °C) 97 %      MAP       --         Physical Exam    Nursing note and vitals reviewed.  Constitutional: She appears well-developed and well-nourished. She is not diaphoretic.   Awake, alert, nontoxic adult female.   HENT:   Head: Normocephalic and atraumatic.   Mouth/Throat: Oropharynx is clear and moist.   Eyes: Conjunctivae and EOM are normal. Pupils are equal, round, and reactive to light.   Neck: Normal range of motion. Neck supple.   Cardiovascular: Normal rate, regular rhythm, normal heart sounds and intact distal pulses.   No murmur heard.  Pulmonary/Chest: Breath sounds normal. No respiratory distress. She has no wheezes. She has no rhonchi. She has no rales.   Abdominal: Soft. Bowel sounds are normal. She exhibits no distension. There is no tenderness. There is no rebound and no guarding.   Musculoskeletal: Normal range of motion. She exhibits no edema or tenderness.   Neurological: She is alert and oriented to person, place, and time. She has normal strength.   Moving all extremities.   Skin: Skin is warm and dry. No erythema. No pallor.   Psychiatric: She has a normal mood and affect.         ED Course   Procedures  Labs Reviewed   URINALYSIS, REFLEX TO URINE CULTURE - Abnormal; Notable for the following components:       Result Value     "Appearance, UA Hazy (*)     Occult Blood UA 1+ (*)     Leukocytes, UA 2+ (*)     All other components within normal limits    Narrative:     Preferred Collection Type->Urine, Clean Catch   CBC W/ AUTO DIFFERENTIAL - Abnormal; Notable for the following components:    Hemoglobin 11.8 (*)     Hematocrit 36.5 (*)     MCV 79 (*)     MCH 25.4 (*)     RDW 14.9 (*)     All other components within normal limits   COMPREHENSIVE METABOLIC PANEL - Abnormal; Notable for the following components:    CO2 20 (*)     All other components within normal limits   B-TYPE NATRIURETIC PEPTIDE   D DIMER, QUANTITATIVE   TROPONIN I   URINALYSIS MICROSCOPIC    Narrative:     Preferred Collection Type->Urine, Clean Catch   DRUG SCREEN PANEL, URINE EMERGENCY   POCT URINE PREGNANCY     EKG Readings: (Independently Interpreted)   02:35: NSR, HR 62. Normal axis. TWI in III. No STEMI. C/w 4/8/17.       Imaging Results          X-Ray Chest AP Portable (Final result)  Result time 09/21/18 03:42:33    Final result by Ayden Rasheed MD (09/21/18 03:42:33)                 Impression:      Mildly increased density at the left lung base, favored to reflect soft tissue attenuation.  Pleural effusion or infectious/inflammatory process is felt to be less likely.  Consider evaluation with upright PA and lateral views if indicated.      Electronically signed by: Ayden Rasheed MD  Date:    09/21/2018  Time:    03:42             Narrative:    EXAMINATION:  XR CHEST AP PORTABLE    CLINICAL HISTORY:  Provided history is "  Shortness of breath".    TECHNIQUE:  One view of the chest.    COMPARISON:  Chest radiograph, 04/08/2017 and 05/06/2018.    FINDINGS:  Evaluation is limited by portable technique and attenuation of the x-ray beam, particularly at the bases.  Cardiac silhouette is borderline enlarged but stable.  There is no large focal consolidation.  Mildly increased density at the left lung base, possibly related to soft tissue attenuation, pleural " fluid, or infectious/inflammatory process.  Lungs are otherwise essentially clear.                              X-Rays:   Independently Interpreted Readings:   Other Readings:  CXR NAD    Medical Decision Making:   History:   Old Medical Records: I decided to obtain old medical records.  Old Records Summarized: records from previous admission(s).  Initial Assessment:   This is an emergent evaluation of a 28 y.o. Female with CP, SOB, dizziness, blurred vision.   Differential Diagnosis:   Ddx includes ACS, CHF, PNA, symptomatic anemia, other.  Independently Interpreted Test(s):   I have ordered and independently interpreted X-rays - see prior notes.  I have ordered and independently interpreted EKG Reading(s) - see prior notes  Clinical Tests:   Lab Tests: Ordered and Reviewed  Radiological Study: Ordered and Reviewed  Medical Tests: Ordered and Reviewed  ED Management:  UPT negative.     EKG no STEMI.    CXR NAD.    CBC, CMP, troponin, BNP, ddimer. Unlikely ACS given duration of CP and negative troponin. Unlikely PE/dissection given negative dimer. Also low suspicion for GERD/gastritis/biliary colic given no abdominal TTP.     Patient complaints similar to those she has been seen for in the past (see eg my note 11/13/16). She did have elevated BP here and on prior visits. I have initiated her on Norvasc therapy for HTN. I have also rx'ed hydroxyzine PRN insomnia as patient reports this symptom as well.    Strongly encouraged to f/u to PMD. No emergent threat to life/function on today's workup.             Scribe Attestation:   Scribe #1: I performed the above scribed service and the documentation accurately describes the services I performed. I attest to the accuracy of the note.    Attending Attestation:           Physician Attestation for Scribe:  Physician Attestation Statement for Scribe #1: I, Melisa Luo MD, reviewed documentation, as scribed by Margo Moreau in my presence, and it is both accurate and  complete.                    Clinical Impression:   The primary encounter diagnosis was Chest pain, unspecified type. Diagnoses of Shortness of breath, Elevated blood pressure reading, and Hypertension, unspecified type were also pertinent to this visit.                             Melisa Luo MD  09/22/18 [id number]

## 2018-09-21 NOTE — ED TRIAGE NOTES
"28 y.o female presents to the ED via personal transport. The patient has multiple medical complaints. She reports dental pain but states she has an apt for dental extractions on Monday. She reports bilateral ear pain. She states she is having pressure behind her left eye with blurry vision. She reports SOB and states she feels as if she is having anxiety. She reports feeling as if her heart is skipping a beat. She states "I just don't feel good, I feel like I am going to pass out." She states that she believes the symptoms could be from fatigue due to lack of sleep and over working. She reports taking prescribed medication for anxiety tonight w/ no relief of symptoms. She appears fatigued in the stretcher. She is aaox4, ND. She is calm and cooperative. She is on the cardiac monitor, blood pressure cuff and pulse ox. Call light in reach, side rails up x2. Will continue to monitor.   "

## 2018-12-10 ENCOUNTER — HOSPITAL ENCOUNTER (EMERGENCY)
Facility: HOSPITAL | Age: 28
Discharge: HOME | End: 2018-12-10
Attending: EMERGENCY MEDICINE
Payer: MEDICAID

## 2018-12-10 VITALS
DIASTOLIC BLOOD PRESSURE: 95 MMHG | HEIGHT: 70 IN | OXYGEN SATURATION: 98 % | TEMPERATURE: 98 F | SYSTOLIC BLOOD PRESSURE: 144 MMHG | RESPIRATION RATE: 16 BRPM | HEART RATE: 63 BPM | WEIGHT: 293 LBS | BODY MASS INDEX: 41.95 KG/M2

## 2018-12-10 DIAGNOSIS — T14.90XA BLUNT TRAUMA: ICD-10-CM

## 2018-12-10 DIAGNOSIS — S46.812A STRAIN OF LEFT TRAPEZIUS MUSCLE, INITIAL ENCOUNTER: Primary | ICD-10-CM

## 2018-12-10 DIAGNOSIS — M79.632 LEFT FOREARM PAIN: ICD-10-CM

## 2018-12-10 LAB
B-HCG UR QL: NEGATIVE
CTP QC/QA: YES

## 2018-12-10 PROCEDURE — 25000003 PHARM REV CODE 250: Performed by: NURSE PRACTITIONER

## 2018-12-10 PROCEDURE — 81025 URINE PREGNANCY TEST: CPT | Performed by: PHYSICIAN ASSISTANT

## 2018-12-10 PROCEDURE — 99283 EMERGENCY DEPT VISIT LOW MDM: CPT | Mod: 25

## 2018-12-10 RX ORDER — IBUPROFEN 800 MG/1
800 TABLET ORAL EVERY 6 HOURS PRN
Qty: 20 TABLET | Refills: 0 | Status: SHIPPED | OUTPATIENT
Start: 2018-12-10 | End: 2019-11-06

## 2018-12-10 RX ORDER — IBUPROFEN 400 MG/1
800 TABLET ORAL
Status: COMPLETED | OUTPATIENT
Start: 2018-12-10 | End: 2018-12-10

## 2018-12-10 RX ORDER — METHOCARBAMOL 500 MG/1
500 TABLET, FILM COATED ORAL
Status: COMPLETED | OUTPATIENT
Start: 2018-12-10 | End: 2018-12-10

## 2018-12-10 RX ORDER — METHOCARBAMOL 500 MG/1
500 TABLET, FILM COATED ORAL 3 TIMES DAILY
Qty: 30 TABLET | Refills: 0 | Status: SHIPPED | OUTPATIENT
Start: 2018-12-10 | End: 2018-12-15

## 2018-12-10 RX ADMIN — METHOCARBAMOL TABLETS 500 MG: 500 TABLET, COATED ORAL at 09:12

## 2018-12-10 RX ADMIN — IBUPROFEN 800 MG: 400 TABLET, FILM COATED ORAL at 09:12
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
## 2018-12-11 NOTE — ED NOTES
Patient instructed on urine specimen collection and patient verbalized understanding of instructions.
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
## 2019-02-02 ENCOUNTER — HOSPITAL ENCOUNTER (EMERGENCY)
Facility: HOSPITAL | Age: 29
Discharge: HOME | End: 2019-02-03
Attending: EMERGENCY MEDICINE
Payer: MEDICAID

## 2019-02-02 DIAGNOSIS — Z71.1 CONCERN ABOUT STD IN FEMALE WITHOUT DIAGNOSIS: ICD-10-CM

## 2019-02-02 DIAGNOSIS — R39.9 URINARY TRACT INFECTION SYMPTOMS: Primary | ICD-10-CM

## 2019-02-02 PROCEDURE — 81025 URINE PREGNANCY TEST: CPT | Performed by: PHYSICIAN ASSISTANT

## 2019-02-02 PROCEDURE — 99284 EMERGENCY DEPT VISIT MOD MDM: CPT | Mod: 25

## 2019-02-02 PROCEDURE — 96372 THER/PROPH/DIAG INJ SC/IM: CPT

## 2019-02-03 VITALS
TEMPERATURE: 98 F | HEART RATE: 71 BPM | HEIGHT: 70 IN | WEIGHT: 293 LBS | SYSTOLIC BLOOD PRESSURE: 161 MMHG | OXYGEN SATURATION: 99 % | DIASTOLIC BLOOD PRESSURE: 94 MMHG | BODY MASS INDEX: 41.95 KG/M2 | RESPIRATION RATE: 18 BRPM

## 2019-02-03 LAB
B-HCG UR QL: NEGATIVE
BACTERIA #/AREA URNS HPF: ABNORMAL /HPF
BILIRUB UR QL STRIP: NEGATIVE
CLARITY UR: CLEAR
COLOR UR: YELLOW
CTP QC/QA: YES
GLUCOSE UR QL STRIP: NEGATIVE
HGB UR QL STRIP: NEGATIVE
KETONES UR QL STRIP: NEGATIVE
LEUKOCYTE ESTERASE UR QL STRIP: ABNORMAL
MICROSCOPIC COMMENT: ABNORMAL
NITRITE UR QL STRIP: NEGATIVE
PH UR STRIP: 6 [PH] (ref 5–8)
PROT UR QL STRIP: NEGATIVE
RBC #/AREA URNS HPF: 2 /HPF (ref 0–4)
SP GR UR STRIP: 1.03 (ref 1–1.03)
SQUAMOUS #/AREA URNS HPF: 5 /HPF
URN SPEC COLLECT METH UR: ABNORMAL
UROBILINOGEN UR STRIP-ACNC: ABNORMAL EU/DL
WBC #/AREA URNS HPF: 20 /HPF (ref 0–5)

## 2019-02-03 PROCEDURE — 63600175 PHARM REV CODE 636 W HCPCS: Performed by: PHYSICIAN ASSISTANT

## 2019-02-03 PROCEDURE — 87086 URINE CULTURE/COLONY COUNT: CPT

## 2019-02-03 PROCEDURE — 81000 URINALYSIS NONAUTO W/SCOPE: CPT

## 2019-02-03 PROCEDURE — 25000003 PHARM REV CODE 250: Performed by: PHYSICIAN ASSISTANT

## 2019-02-03 RX ORDER — PHENAZOPYRIDINE HYDROCHLORIDE 200 MG/1
200 TABLET, FILM COATED ORAL
Qty: 12 TABLET | Refills: 0 | Status: SHIPPED | OUTPATIENT
Start: 2019-02-03 | End: 2019-02-07

## 2019-02-03 RX ORDER — ONDANSETRON 8 MG/1
8 TABLET, ORALLY DISINTEGRATING ORAL
Status: DISCONTINUED | OUTPATIENT
Start: 2019-02-03 | End: 2019-02-03 | Stop reason: HOSPADM

## 2019-02-03 RX ORDER — AZITHROMYCIN 250 MG/1
1000 TABLET, FILM COATED ORAL
Status: COMPLETED | OUTPATIENT
Start: 2019-02-03 | End: 2019-02-03

## 2019-02-03 RX ORDER — CEFTRIAXONE 250 MG/1
250 INJECTION, POWDER, FOR SOLUTION INTRAMUSCULAR; INTRAVENOUS ONCE
Status: COMPLETED | OUTPATIENT
Start: 2019-02-03 | End: 2019-02-03

## 2019-02-03 RX ORDER — CEPHALEXIN 500 MG/1
500 CAPSULE ORAL EVERY 12 HOURS
Qty: 20 CAPSULE | Refills: 0 | Status: SHIPPED | OUTPATIENT
Start: 2019-02-03 | End: 2019-02-13

## 2019-02-03 RX ORDER — FLUCONAZOLE 200 MG/1
200 TABLET ORAL DAILY
Qty: 1 TABLET | Refills: 0 | Status: SHIPPED | OUTPATIENT
Start: 2019-02-03 | End: 2019-02-04

## 2019-02-03 RX ADMIN — CEFTRIAXONE SODIUM 250 MG: 250 INJECTION, POWDER, FOR SOLUTION INTRAMUSCULAR; INTRAVENOUS at 01:02

## 2019-02-03 RX ADMIN — AZITHROMYCIN 1000 MG: 250 TABLET, FILM COATED ORAL at 01:02

## 2019-02-03 NOTE — ED TRIAGE NOTES
Patient presents to the ER with mother via personal vehicle. Patient presents with abdominal pain (cramping), and vaginal irritation. Denies discharge. Reports unprotected sex a week ago.

## 2019-02-03 NOTE — ED PROVIDER NOTES
Encounter Date: 2/2/2019    SCRIBE #1 NOTE: I, Freeman Smith, am scribing for, and in the presence of,  Jayant Mendoza PA-C. I have scribed the following portions of the note - Other sections scribed: HPI and ROS.       History     Chief Complaint   Patient presents with    Abdominal Pain     pt complains of midline abd pain and vaginal irritation x 2 days. pt states she is nervous because she had unpretected sex a few days ago.     CC: Abdominal Pain    HPI: This 28 y.o F with Anxiety presents to the ED c/o acute onset of generalized abdominal cramping with associated dysuria and frequency since yesterday. The pt notes recent unprotected sex with a new partner prior to the onset of her symptoms. She is concerned that she may have been exposed to a STD. She denies pelvic pain, vaginal discharge, fever, chills, diaphoresis, nausea, emesis, diarrhea, urinary frequency, chest pain, SOB and rash. She attempted tx with left over Keflex today and Tylenol last night with no relief.       The history is provided by the patient. No  was used.     Review of patient's allergies indicates:  No Known Allergies  Past Medical History:   Diagnosis Date    Anxiety     Dermatitis     GERD (gastroesophageal reflux disease)     Hypertension     Pruritic disorder      History reviewed. No pertinent surgical history.  Family History   Problem Relation Age of Onset    Hypertension Mother     Eczema Sister      Social History     Tobacco Use    Smoking status: Never Smoker    Smokeless tobacco: Never Used   Substance Use Topics    Alcohol use: Yes     Comment: occ    Drug use: Yes     Types: Marijuana     Comment: occ     Review of Systems   Constitutional: Negative for chills, diaphoresis and fever.   HENT: Negative for congestion, rhinorrhea and sore throat.    Eyes: Negative for redness.   Respiratory: Negative for cough and shortness of breath.    Cardiovascular: Negative for chest pain.    Gastrointestinal: Positive for abdominal pain. Negative for diarrhea, nausea and vomiting.   Genitourinary: Positive for dysuria and frequency. Negative for difficulty urinating, urgency, vaginal bleeding and vaginal discharge.   Musculoskeletal: Negative for back pain and neck pain.   Skin: Negative for rash.   Psychiatric/Behavioral: The patient is not nervous/anxious.        Physical Exam     Initial Vitals [02/02/19 2139]   BP Pulse Resp Temp SpO2   139/84 74 16 98.2 °F (36.8 °C) 100 %      MAP       --         Physical Exam    Nursing note and vitals reviewed.  Constitutional: She appears well-developed and well-nourished. She is not diaphoretic. No distress.   HENT:   Head: Normocephalic and atraumatic.   Nose: Nose normal.   Mouth/Throat: Oropharynx is clear and moist. No oropharyngeal exudate.   Eyes: Conjunctivae and EOM are normal. Right eye exhibits no discharge. Left eye exhibits no discharge.   Neck: Normal range of motion. No tracheal deviation present. No JVD present.   Cardiovascular: Normal rate, regular rhythm and normal heart sounds. Exam reveals no friction rub.    No murmur heard.  Pulmonary/Chest: Breath sounds normal. No stridor. No respiratory distress. She has no wheezes. She has no rhonchi. She has no rales. She exhibits no tenderness.   Abdominal: Soft. She exhibits no distension. There is tenderness (Mild suprapubic without lateralization). There is no rigidity, no rebound, no guarding, no CVA tenderness, no tenderness at McBurney's point and negative Parks's sign.   Genitourinary: Pelvic exam was performed with patient supine. There is no rash, tenderness or lesion on the right labia. There is no rash, tenderness or lesion on the left labia. Cervix exhibits friability. Cervix exhibits no motion tenderness and no discharge. Right adnexum displays no mass and no tenderness. Left adnexum displays no mass and no tenderness. No erythema, tenderness or bleeding in the vagina. No foreign  body in the vagina. No signs of injury around the vagina. Vaginal discharge found.   Musculoskeletal: Normal range of motion.   Neurological: She is alert and oriented to person, place, and time.   Skin: Skin is warm and dry. No rash and no abscess noted. No erythema. No pallor.         ED Course   Procedures  Labs Reviewed   URINALYSIS, REFLEX TO URINE CULTURE - Abnormal; Notable for the following components:       Result Value    Urobilinogen, UA 4.0-6.0 (*)     Leukocytes, UA 2+ (*)     All other components within normal limits    Narrative:     Preferred Collection Type->Urine, Clean Catch   URINALYSIS MICROSCOPIC - Abnormal; Notable for the following components:    WBC, UA 20 (*)     Bacteria, UA Few (*)     All other components within normal limits    Narrative:     Preferred Collection Type->Urine, Clean Catch   C. TRACHOMATIS/N. GONORRHOEAE BY AMP DNA   CULTURE, URINE   POCT URINE PREGNANCY          Imaging Results    None          Medical Decision Making:   History:   Old Medical Records: I decided to obtain old medical records.  Initial Assessment:   28-year-old female concern for STD  Clinical Tests:   Lab Tests: Ordered and Reviewed  ED Management:  No PID on exam.  Urinalysis shows significant amount of bacteria that is likely attributed to STD given her specific concern.  Will treat empirically while GC is pending.  No pyelonephritis or urosepsis.    Sent home with supportive care. Advising PCP and OBGYN follow up. Strict return precautions discussed. Agreeable to plan.               Scribe Attestation:   Scribe #1: I performed the above scribed service and the documentation accurately describes the services I performed. I attest to the accuracy of the note.    Attending Attestation:           Physician Attestation for Scribe:  Physician Attestation Statement for Scribe #1: I, Jayant Mendoza PA-C, reviewed documentation, as scribed by Freeman Smith in my presence, and it is both accurate and complete.                     Clinical Impression:   The primary encounter diagnosis was Urinary tract infection symptoms. A diagnosis of Concern about STD in female without diagnosis was also pertinent to this visit.      Disposition:   Disposition: Discharged  Condition: Stable                        Jayant Mendoza PA-C  02/03/19 0218

## 2019-02-04 ENCOUNTER — HOSPITAL ENCOUNTER (EMERGENCY)
Facility: HOSPITAL | Age: 29
Discharge: HOME | End: 2019-02-04
Attending: EMERGENCY MEDICINE
Payer: MEDICAID

## 2019-02-04 VITALS
HEART RATE: 72 BPM | OXYGEN SATURATION: 98 % | DIASTOLIC BLOOD PRESSURE: 68 MMHG | WEIGHT: 293 LBS | HEIGHT: 70 IN | SYSTOLIC BLOOD PRESSURE: 132 MMHG | TEMPERATURE: 99 F | RESPIRATION RATE: 18 BRPM | BODY MASS INDEX: 41.95 KG/M2

## 2019-02-04 DIAGNOSIS — N76.0 VULVOVAGINITIS: Primary | ICD-10-CM

## 2019-02-04 PROCEDURE — 99284 EMERGENCY DEPT VISIT MOD MDM: CPT

## 2019-02-04 PROCEDURE — 25000003 PHARM REV CODE 250: Performed by: PHYSICIAN ASSISTANT

## 2019-02-04 RX ORDER — ASPIRIN 325 MG
1 TABLET, DELAYED RELEASE (ENTERIC COATED) ORAL NIGHTLY
Qty: 1 TUBE | Refills: 0 | Status: SHIPPED | OUTPATIENT
Start: 2019-02-04 | End: 2019-02-04 | Stop reason: ALTCHOICE

## 2019-02-04 RX ORDER — METRONIDAZOLE 7.5 MG/G
GEL VAGINAL
Qty: 70 G | Refills: 0 | Status: SHIPPED | OUTPATIENT
Start: 2019-02-04 | End: 2019-11-06

## 2019-02-04 RX ORDER — FLUCONAZOLE 150 MG/1
150 TABLET ORAL DAILY
Qty: 1 TABLET | Refills: 0 | Status: SHIPPED | OUTPATIENT
Start: 2019-02-04 | End: 2019-02-05

## 2019-02-04 RX ORDER — FLUCONAZOLE 150 MG/1
150 TABLET ORAL DAILY
Qty: 1 TABLET | Refills: 0 | Status: SHIPPED | OUTPATIENT
Start: 2019-02-04 | End: 2019-02-04 | Stop reason: SDUPTHER

## 2019-02-04 RX ORDER — FLUCONAZOLE 100 MG/1
200 TABLET ORAL
Status: COMPLETED | OUTPATIENT
Start: 2019-02-04 | End: 2019-02-04

## 2019-02-04 RX ORDER — METRONIDAZOLE 500 MG/1
500 TABLET ORAL 2 TIMES DAILY
Qty: 14 TABLET | Refills: 0 | Status: SHIPPED | OUTPATIENT
Start: 2019-02-04 | End: 2019-02-04 | Stop reason: ALTCHOICE

## 2019-02-04 RX ADMIN — FLUCONAZOLE 200 MG: 100 TABLET ORAL at 07:02

## 2019-02-04 NOTE — ED PROVIDER NOTES
"Encounter Date: 2/4/2019    This is a SORT/MSE of a 28 y.o. female presenting to the ED with c/o dysuria. Seen here yesterday and started on ABX. Reports symptoms not improving and has yeast infection. Care will be transferred to an alternate provider when patient is roomed for a full evaluation and final disposition. LORETTA Brink, FNP-C 2/4/2019 5:56 PM    SCRIBE #1 NOTE: I, Pravin Benton, am scribing for, and in the presence of,  Boogie HERNANDEZ. I have scribed the following portions of the note - Other sections scribed: HPI, ROS .       History     Chief Complaint   Patient presents with    Dysuria     Pt reports she was seen here yesterday for UTI. Pt sent home w/abx. Pt states symptoms worse today and now she has "a yeast infection."     CC: Dysuria     This is a 28 y.o Female with pmhx of anxiety, GERD, HTN and pruritic disorder presenting to the ED for reevaluation of dysuria. Pt was seen at this facility with symptoms of abdominal pain, dysuria, vaginal swelling, dryness, and itchiness on 2/2/19. Pt was treated for an STD with cephalexin 500 mg, fluconazole 200 mg Oral Daily, and phenazopyridine HCl 200. Pt reports that she is here today because her swelling and appearance has worsened and she believes that she has a yeast infection. Pt has had yeast infections after taking antibiotics in the past. Pt denies fever, emesis, constipation, diarrhea and change in appetite. No other symptoms to report.        The history is provided by the patient. No  was used.     Review of patient's allergies indicates:  No Known Allergies  Past Medical History:   Diagnosis Date    Anxiety     Dermatitis     GERD (gastroesophageal reflux disease)     Hypertension     Pruritic disorder      History reviewed. No pertinent surgical history.  Family History   Problem Relation Age of Onset    Hypertension Mother     Eczema Sister      Social History     Tobacco Use    Smoking status: Never Smoker    " Smokeless tobacco: Never Used   Substance Use Topics    Alcohol use: Yes     Comment: occ    Drug use: Yes     Types: Marijuana     Comment: occ     Review of Systems   Constitutional: Negative for appetite change, chills and fever.   HENT: Negative for congestion.    Eyes: Negative for redness.   Respiratory: Negative for cough.    Gastrointestinal: Negative for constipation, diarrhea and vomiting.   Genitourinary: Positive for dysuria and vaginal pain.        (+) vaginal itchiness  (+) vaginal swelling      Musculoskeletal: Negative for back pain.   Skin: Negative for rash.   Neurological: Negative for headaches.       Physical Exam     Initial Vitals [02/04/19 1743]   BP Pulse Resp Temp SpO2   (!) 146/82 63 18 98.2 °F (36.8 °C) 99 %      MAP       --         Physical Exam    Nursing note and vitals reviewed.  Constitutional: She appears well-developed and well-nourished. She is not diaphoretic. No distress.   HENT:   Head: Normocephalic and atraumatic.   Eyes: Conjunctivae and EOM are normal. Pupils are equal, round, and reactive to light.   Neck: Normal range of motion. Neck supple. No tracheal deviation present.   Cardiovascular: Intact distal pulses.   Pulmonary/Chest: Breath sounds normal. No stridor. No respiratory distress. She has no wheezes.   Abdominal: Soft. Bowel sounds are normal.   Mild suprapubic ttp. No rebound or guarding.   Genitourinary:   Genitourinary Comments: No vesicular lesions or open sores to external genitalia. Moderate amount thick, white/yellow d/c; moderate amount curd-like d/c in vault. Entrance to vagina very dry membranes. No gross bleeding, tissue within vaginal vault. No adnexal mass or ttp. Cervix os closed. No cervix erythema/friability. No CMT. No significant bleeding, tissue, discharge from os.   Lymphadenopathy:     She has no cervical adenopathy.   Neurological: She is alert and oriented to person, place, and time.   Skin: Skin is warm and dry. Capillary refill takes  less than 2 seconds.   Psychiatric: She has a normal mood and affect. Her behavior is normal. Judgment and thought content normal.         ED Course   Procedures  Labs Reviewed - No data to display       Imaging Results    None          Medical Decision Making:   Differential Diagnosis:   Vulvovaginitis, Candida, tinea versicolor, tinea cruris, PID, UTI, STI  ED Management:  28-year-old female with chief complaint worsening vaginal irritation, worsening vaginal discharge x3 days.  Presented here yesterday, treated for gonorrhea chlamydia.  Cultures pending.  No vaginal screen yesterday.  Currently on Keflex.  She admits to frequent yeast infections when taking by mouth antibiotics.  No worsening abdominal pain.  No fever.  No nausea vomiting. On exam vaginal introitus very dry. There is moderate amount of thick, yellow/white discharge with some curd-like discharge as well. Diflucan given in the ED.  Patient will take her 2nd dose of Diflucan at the end of her Keflex.  I will DC with Metrogel as this does have the appearance of both a yeast infection and possibly BV.  She will follow up with her OBGYN.  Return precautions given.  She does understand and agree.            Scribe Attestation:   Scribe #1: I performed the above scribed service and the documentation accurately describes the services I performed. I attest to the accuracy of the note.    Attending Attestation:           Physician Attestation for Scribe:  Physician Attestation Statement for Scribe #1: I, Boogie HERNANDEZ, reviewed documentation, as scribed by Pravin Benton  in my presence, and it is both accurate and complete.                    Clinical Impression:   The encounter diagnosis was Vulvovaginitis.      Disposition:   Disposition: Discharged  Condition: Stable                        Boogie Leyva PA-C  02/04/19 2002

## 2019-02-05 LAB — BACTERIA UR CULT: NORMAL

## 2019-02-05 NOTE — MEDICAL/APP STUDENT
"  History     Chief Complaint   Patient presents with    Dysuria     Pt reports she was seen here yesterday for UTI. Pt sent home w/abx. Pt states symptoms worse today and now she has "a yeast infection."     HPI  Ms. Velazco is a 28 year old female with Anxiety who presents to the ED complaining of generalized abdominal cramping associated with dysuria, increased frequency, and vaginal discharge. She was seen in the ED yesterday for similar complaints. She reports her symptoms have worsened and believes she has a "yeast infection". She was prophylactically treated for GC. She was discharged with Keflex, Diflucan, and Pyridium. She was also prescribed Diflucan which she reports she has been taking as well. Denies fever, chills, nausea, vomiting, diarrhea, pelvic pain, vaginal bleeding. No further complaints.    Past Medical History:   Diagnosis Date    Anxiety     Dermatitis     GERD (gastroesophageal reflux disease)     Hypertension     Pruritic disorder        No past surgical history on file.    Family History   Problem Relation Age of Onset    Hypertension Mother     Eczema Sister        Social History     Tobacco Use    Smoking status: Never Smoker    Smokeless tobacco: Never Used   Substance Use Topics    Alcohol use: Yes     Comment: occ    Drug use: Yes     Types: Marijuana     Comment: occ       Review of Systems   Constitutional: Negative for chills and fever.   Respiratory: Negative for shortness of breath.    Cardiovascular: Negative for chest pain.   Gastrointestinal: Positive for abdominal pain (cramping). Negative for diarrhea, nausea and vomiting.   Genitourinary: Positive for dysuria, frequency and vaginal discharge. Negative for decreased urine volume, pelvic pain and vaginal bleeding.   Musculoskeletal: Negative for arthralgias, back pain and neck pain.   Skin: Negative for rash and wound.   Neurological: Negative for dizziness and headaches.   All other systems reviewed and are " "negative.      Physical Exam   BP (!) 146/82 (BP Location: Right arm, Patient Position: Sitting)   Pulse 63   Temp 98.2 °F (36.8 °C) (Oral)   Resp 18   Ht 5' 10" (1.778 m)   Wt 136.1 kg (300 lb)   LMP 01/25/2019   SpO2 99%   BMI 43.05 kg/m²     Physical Exam    Nursing note and vitals reviewed.  Constitutional:   She is well appearing, non toxic. She appears anxious.   HENT:   Head: Normocephalic and atraumatic.   Right Ear: External ear normal.   Left Ear: External ear normal.   Mouth/Throat: Oropharynx is clear and moist. No oropharyngeal exudate.   Eyes: EOM are normal. Pupils are equal, round, and reactive to light.   Neck: Normal range of motion. Neck supple.   Cardiovascular: Normal rate and regular rhythm.   Pulmonary/Chest: Breath sounds normal. No respiratory distress.   Abdominal: Soft. Bowel sounds are normal. There is no tenderness.   Musculoskeletal: Normal range of motion. She exhibits no edema or tenderness.   Neurological: She is alert and oriented to person, place, and time. She has normal strength. GCS score is 15. GCS eye subscore is 4. GCS verbal subscore is 5. GCS motor subscore is 6.   Skin: Skin is warm and dry. Capillary refill takes less than 2 seconds.   Psychiatric: She has a normal mood and affect. Thought content normal.         ED Course         "

## 2019-02-05 NOTE — ED TRIAGE NOTES
The pt was seen in the ER yesterday for the same complaint. Reports she is burning and itching when she urinates. Says she has a brownish discharge. Denies fever.

## 2019-02-05 NOTE — DISCHARGE INSTRUCTIONS
Use metrogel vaginal as prescribed. Drink lots of fluids, stay well hydrated. At the end of all of your antibiotics, be sure to take Diflucan.  Follow-up with your OBGYN for re-evaluation.  Return to this ED if symptoms worsen or persist despite treatment, if any other problems occur.

## 2019-05-18 ENCOUNTER — HOSPITAL ENCOUNTER (EMERGENCY)
Facility: HOSPITAL | Age: 29
Discharge: HOME | End: 2019-05-18
Attending: EMERGENCY MEDICINE
Payer: MEDICAID

## 2019-05-18 VITALS
HEIGHT: 70 IN | BODY MASS INDEX: 41.95 KG/M2 | TEMPERATURE: 99 F | RESPIRATION RATE: 20 BRPM | HEART RATE: 90 BPM | DIASTOLIC BLOOD PRESSURE: 84 MMHG | WEIGHT: 293 LBS | OXYGEN SATURATION: 100 % | SYSTOLIC BLOOD PRESSURE: 143 MMHG

## 2019-05-18 DIAGNOSIS — F41.9 ANXIETY: ICD-10-CM

## 2019-05-18 PROCEDURE — 93010 EKG 12-LEAD: ICD-10-PCS | Mod: ,,, | Performed by: INTERNAL MEDICINE

## 2019-05-18 PROCEDURE — 99283 EMERGENCY DEPT VISIT LOW MDM: CPT

## 2019-05-18 PROCEDURE — 25000003 PHARM REV CODE 250: Performed by: EMERGENCY MEDICINE

## 2019-05-18 PROCEDURE — 93005 ELECTROCARDIOGRAM TRACING: CPT

## 2019-05-18 PROCEDURE — 93010 ELECTROCARDIOGRAM REPORT: CPT | Mod: ,,, | Performed by: INTERNAL MEDICINE

## 2019-05-18 RX ORDER — LORAZEPAM 0.5 MG/1
2 TABLET ORAL
Status: COMPLETED | OUTPATIENT
Start: 2019-05-18 | End: 2019-05-18

## 2019-05-18 RX ORDER — HYDROXYZINE HYDROCHLORIDE 50 MG/1
50 TABLET, FILM COATED ORAL EVERY 6 HOURS PRN
Qty: 30 TABLET | Refills: 0 | Status: SHIPPED | OUTPATIENT
Start: 2019-05-18 | End: 2019-11-06

## 2019-05-18 RX ADMIN — LORAZEPAM 2 MG: 0.5 TABLET ORAL at 02:05

## 2019-05-18 NOTE — DISCHARGE INSTRUCTIONS
Thank you for coming to our Emergency Department today. It is important to remember that some problems are difficult to diagnose and may not be found during your first visit. Be sure to follow up with your primary care doctor and review any labs/imaging that was performed with them. If you do not have a primary care doctor, you may contact the one listed on your discharge paperwork or you may also call the [facility] Appointment Desk at [phone] to schedule an appointment with one.     Return to the ER with any questions/concerns, new/concerning symptoms, worsening or failure to improve. Do not drive or make any important decisions for 24 hours if you have received any pain medications, sedatives or mood altering drugs during your ER visit.

## 2019-05-18 NOTE — ED TRIAGE NOTES
Pt comes to ED today with complaints of chest pain, and tingling all over her body.  Pt reports that this started about an hour ago.  At this time, pt is alert and oriented x4, VSS and she appears to be in no acute distress at this time.

## 2019-05-18 NOTE — ED PROVIDER NOTES
"Encounter Date: 5/18/2019       History     Chief Complaint   Patient presents with    Chest Pain     pt reports that she began to feel CP after taking an anxiety medication "starts with a L" that is not hers, pt reports that she began to feel tingling in her hands and lips.     29 y.o. female Past Medical History:  No date: Anxiety  No date: Dermatitis  No date: GERD (gastroesophageal reflux disease)  No date: Hypertension  No date: Pruritic disorder   Morbid obesity  Anxiety    Notes that she recently had a death in her family and is very anxious. States that she felt herself start to have an anxiety attack and took one of her aunt's anxiety medications (she does not know what it is) but it did not help her.  She notes perioral tingling, tingling to hands, sensation of world closing in, chest tightness, tachypnea and panic. Presents for evaluation.         Review of patient's allergies indicates:  No Known Allergies  Past Medical History:   Diagnosis Date    Anxiety     Dermatitis     GERD (gastroesophageal reflux disease)     Hypertension     Pruritic disorder      No past surgical history on file.  Family History   Problem Relation Age of Onset    Hypertension Mother     Eczema Sister      Social History     Tobacco Use    Smoking status: Never Smoker    Smokeless tobacco: Never Used   Substance Use Topics    Alcohol use: Yes     Comment: occ    Drug use: Yes     Types: Marijuana     Comment: occ     Review of Systems   Constitutional: Negative for fever.   HENT: Negative for sore throat.    Respiratory: Positive for shortness of breath.    Cardiovascular: Negative for chest pain.   Gastrointestinal: Negative for nausea.   Genitourinary: Negative for dysuria.   Musculoskeletal: Negative for back pain.   Skin: Negative for rash.   Neurological: Negative for weakness.   Hematological: Does not bruise/bleed easily.   All other systems reviewed and are negative.      Physical Exam     Initial Vitals " [05/18/19 1437]   BP Pulse Resp Temp SpO2   (!) 164/89 89 20 98.2 °F (36.8 °C) 100 %      MAP       --         Physical Exam    Nursing note and vitals reviewed.  Constitutional: She appears well-developed and well-nourished.   HENT:   Head: Normocephalic and atraumatic.   Eyes: Conjunctivae and EOM are normal. Pupils are equal, round, and reactive to light.   Neck: Normal range of motion.   Cardiovascular: Normal rate and regular rhythm.   Pulmonary/Chest: Breath sounds normal. No respiratory distress. She has no wheezes. She has no rales.   Abdominal: She exhibits no distension.   Musculoskeletal: Normal range of motion.   Neurological: She is alert. No cranial nerve deficit. GCS score is 15. GCS eye subscore is 4. GCS verbal subscore is 5. GCS motor subscore is 6.   Skin: Skin is warm and dry.   Psychiatric: She has a normal mood and affect. Thought content normal.     tachynpeic, anxious  Morbidly obese    ED Course   Procedures  Labs Reviewed - No data to display  EKG Readings: (Independently Interpreted)   Hr 97, sinus, nl axis/intervals, no marsha/twi, non acute, no stemi.       Imaging Results    None                          anxiety attack has resolved. Will discharge pt with script for atarax.     Clinical Impression:       ICD-10-CM ICD-9-CM   1. Anxiety F41.9 300.00                                Vaughn Ag MD  05/18/19 3825

## 2019-11-06 ENCOUNTER — OFFICE VISIT (OUTPATIENT)
Dept: OBSTETRICS AND GYNECOLOGY | Facility: CLINIC | Age: 29
End: 2019-11-06
Payer: MEDICAID

## 2019-11-06 VITALS — WEIGHT: 293 LBS | BODY MASS INDEX: 41.95 KG/M2 | HEIGHT: 70 IN

## 2019-11-06 DIAGNOSIS — R87.619 ABNORMAL CERVICAL PAPANICOLAOU SMEAR, UNSPECIFIED ABNORMAL PAP FINDING: ICD-10-CM

## 2019-11-06 DIAGNOSIS — Z11.3 SCREEN FOR STD (SEXUALLY TRANSMITTED DISEASE): ICD-10-CM

## 2019-11-06 DIAGNOSIS — N89.8 VAGINAL DISCHARGE: Primary | ICD-10-CM

## 2019-11-06 DIAGNOSIS — N92.6 MISSED MENSES: ICD-10-CM

## 2019-11-06 DIAGNOSIS — R73.03 PREDIABETES: ICD-10-CM

## 2019-11-06 LAB
B-HCG UR QL: NEGATIVE
CTP QC/QA: YES

## 2019-11-06 PROCEDURE — 99999 PR PBB SHADOW E&M-EST. PATIENT-LVL III: CPT | Mod: PBBFAC,,, | Performed by: OBSTETRICS & GYNECOLOGY

## 2019-11-06 PROCEDURE — 87481 CANDIDA DNA AMP PROBE: CPT | Mod: 59

## 2019-11-06 PROCEDURE — 88142 CYTOPATH C/V THIN LAYER: CPT

## 2019-11-06 PROCEDURE — 87624 HPV HI-RISK TYP POOLED RSLT: CPT

## 2019-11-06 PROCEDURE — 99999 PR PBB SHADOW E&M-EST. PATIENT-LVL III: ICD-10-PCS | Mod: PBBFAC,,, | Performed by: OBSTETRICS & GYNECOLOGY

## 2019-11-06 PROCEDURE — 81025 URINE PREGNANCY TEST: CPT | Mod: PBBFAC | Performed by: OBSTETRICS & GYNECOLOGY

## 2019-11-06 PROCEDURE — 99213 OFFICE O/P EST LOW 20 MIN: CPT | Mod: PBBFAC | Performed by: OBSTETRICS & GYNECOLOGY

## 2019-11-06 PROCEDURE — 87491 CHLMYD TRACH DNA AMP PROBE: CPT

## 2019-11-06 PROCEDURE — 99205 PR OFFICE/OUTPT VISIT, NEW, LEVL V, 60-74 MIN: ICD-10-PCS | Mod: S$PBB,,, | Performed by: OBSTETRICS & GYNECOLOGY

## 2019-11-06 PROCEDURE — 87801 DETECT AGNT MULT DNA AMPLI: CPT

## 2019-11-06 PROCEDURE — 99205 OFFICE O/P NEW HI 60 MIN: CPT | Mod: S$PBB,,, | Performed by: OBSTETRICS & GYNECOLOGY

## 2019-11-06 NOTE — PROGRESS NOTES
History & Physical  Gynecology      SUBJECTIVE:     Chief Complaint: Vaginal Discharge; Abdominal Pain; and Possible Pregnancy       History of Present Illness:  Ms. Velazco is a 28 y/o female who presents to clinic for various issues. She reports that she has been noticing foul smelling d//c for the past few months. She reports that she notices the smell especially after intercourse. She reports that she has been treated for BV multiple times in the past but the smell always returns. The patient reports that she had trichomonas at age 20 but it was treated. She also had gonorrhea or chlamydia in the past but reports that it was treated. She reports that has 3 partners within the last 3 months with who she has had unprotected intercourse.     Patient reports that she that had her IUD removed in March 2018 and her periods have been normal since that time except for October. She reports that she did not have a period in October. She reports her periods are monthly and last 5-6 days. She reports that before the IUD her periods were irregular. She once bled for 2 months with heavy menses including blood clots. She reports that she stopped having periods while on the IUD. She reports that she had a TVUS years ago but does not recall why. She reports that she has tracked her periods but has never done any ovulation predictor kits.     She reports that she had an abnormal pap smear in the past but does not recall what the results were.     Review of patient's allergies indicates:  No Known Allergies    Past Medical History:   Diagnosis Date    Anxiety     Dermatitis     GERD (gastroesophageal reflux disease)     Hypertension     Pruritic disorder      History reviewed. No pertinent surgical history.  OB History    None       Family History   Problem Relation Age of Onset    Hypertension Mother     Eczema Sister      Social History     Tobacco Use    Smoking status: Never Smoker    Smokeless tobacco: Never Used    Substance Use Topics    Alcohol use: Yes     Comment: occ    Drug use: Yes     Types: Marijuana     Comment: [facility]       Current Outpatient Medications   Medication Sig    fluticasone (FLONASE) 50 mcg/actuation nasal spray 1 spray (50 mcg total) by Each Nare route once daily.    loratadine (CLARITIN) 10 mg tablet Take 1 tablet (10 mg total) by mouth once daily.    omeprazole (PRILOSEC) 40 MG capsule TAKE ONE CAPSULE BY MOUTH EVERY DAY.    amLODIPine (NORVASC) 5 MG tablet Take 1 tablet (5 mg total) by mouth once daily.     Current Facility-Administered Medications   Medication    dexamethasone injection 8 mg         Review of Systems:  Review of Systems   Constitutional: Negative for chills and fever.   Respiratory: Negative for cough and wheezing.    Cardiovascular: Negative for chest pain and palpitations.   Gastrointestinal: Negative for abdominal pain, nausea and vomiting.   Endocrine: Positive for diabetes.        Prediabetic   Genitourinary: Positive for menstrual problem, vaginal discharge and vaginal odor. Negative for dysuria, frequency, hematuria, pelvic pain, vaginal bleeding and vaginal pain.        OBJECTIVE:     Physical Exam:  Physical Exam   Constitutional: She is oriented to person, place, and time. She appears well-developed and well-nourished.   Cardiovascular: Normal rate.   Pulmonary/Chest: Effort normal. No respiratory distress.   Abdominal: Soft. She exhibits no distension. There is no tenderness.   Genitourinary: Pelvic exam was performed with patient supine.   Genitourinary Comments: Uterus 8-10w and mobile. Cervix friable with mucus like d/c   Neurological: She is alert and oriented to person, place, and time.   Skin: Skin is warm and dry.   Psychiatric: She has a normal mood and affect.   Nursing note and vitals reviewed.    ASSESSMENT:       ICD-10-CM ICD-9-CM    1. Vaginal discharge N89.8 623.5 C. trachomatis/N. gonorrhoeae by AMP DNA      Vaginosis Screen by DNA Probe       Hepatitis panel, acute   2. Missed menses N92.6 626.4 US Pelvis Comp with Transvag NON-OB (xpd      Prolactin      TSH      POCT urine pregnancy   3. Abnormal cervical Papanicolaou smear, unspecified abnormal pap finding R87.619 795.00 Liquid-based pap smear, screening      HPV High Risk Genotypes, PCR   4. Screen for STD (sexually transmitted disease) Z11.3 V74.5 HIV 1/2 Ag/Ab (4th Gen)      RPR      C. trachomatis/N. gonorrhoeae by AMP DNA      Vaginosis Screen by DNA Probe   5. Prediabetes R73.03 790.29 Hemoglobin A1c     Plan:      Evan was seen today for vaginal discharge, abdominal pain and possible pregnancy.    Diagnoses and all orders for this visit:    Vaginal discharge  -     C. trachomatis/N. gonorrhoeae by AMP DNA  -     Vaginosis Screen by DNA Probe  -     Hepatitis panel, acute; Future  - Discussed with patient how douching/body wash/scented soaps/bubble baths can shift her vaginal susanna and natural vaginal pH which can cause overgrowth of yeast or gardnerella which is the bacteria that causes BV. Discussed with patient to use only mild, unscented soaps such as Dove unscented and Dial and water to wash her vagina.     Missed menses  -     US Pelvis Comp with Transvag NON-OB (xpd; Future  -     Prolactin; Future  -     TSH; Future  -     POCT urine pregnancy  - Discuss with patient the possibility of PCOS as a diagnosis. Discussed PCOS with patient using [place] Criteria. Patient reports facial hair that she has wax and now with missed period x1. Discussed TVUS to further explore the diagnosis. Discussed insulin resistance and cardiovascular disease that has been linked to PCOS. Discussed the most important step to take at this time is lifestyle modifications with exercise and diet modification.     Abnormal cervical Papanicolaou smear, unspecified abnormal pap finding  -     Liquid-based pap smear, screening  -     HPV High Risk Genotypes, PCR    Screen for STD (sexually transmitted disease)  -      HIV 1/2 Ag/Ab (4th Gen); Future  -     RPR; Future  -     C. trachomatis/N. gonorrhoeae by AMP DNA  -     Vaginosis Screen by DNA Probe  - Discussed the importance of using protection with each sexual encounter    Prediabetes  -     Hemoglobin A1c; Future        Orders Placed This Encounter   Procedures    HPV High Risk Genotypes, PCR    C. trachomatis/N. gonorrhoeae by AMP DNA    Vaginosis Screen by DNA Probe    US Pelvis Comp with Transvag NON-OB (xpd    HIV 1/2 Ag/Ab (4th Gen)    RPR    Prolactin    TSH    Hemoglobin A1c    Hepatitis panel, acute    POCT urine pregnancy       Follow up in about 3 months (around 2/6/2020) for follow up.    Counseling time: 30 minutes    Sophy Hinds

## 2019-11-06 NOTE — PATIENT INSTRUCTIONS
Preventing Vaginitis     Use mild, unscented soap when you bathe or shower to avoid irritating your vagina.    Vaginitis is irritation or infection of the vagina or vulva (the outside opening of the vagina). Vaginitis can be caused by bacteria, viruses, parasites, or yeast. Chemicals (such as in perfumes or soaps or in spermicides) can sometimes be a cause. Vaginitis can be caused by hormone changes in pregnancy or with menopause. You can help prevent vaginitis. Follow the tips below. And see your healthcare provider if you have any symptoms.  Hygiene  · Avoid chemicals. Do not use vaginal sprays. Do not use scented toilet paper or tampons that are scented. Sprays and scents have chemicals that can irritate your vagina.  · Do not douche unless you are told to by your healthcare provider. Douching is rarely needed. And it upsets the normal balance in the vagina.  · Wash yourself well. Wash the outer vaginal area (vulva) every day with mild, unscented soap. Keep it as dry as possible.  · Wipe correctly. Make sure to wipe from front to back after a bowel movement. This helps keep from spreading bacteria from your anus to your vagina.  · Change your tampon often. During your period, make sure to change your tampon as often as directed on the package. This allows the normal flow of vaginal discharge and blood.  Lifestyle  · Limit your number of sexual partners. The more partners you have, the greater your risk of infection. Using condoms helps reduce your risk.  · Get enough sleep. Sleep helps keep your bodys immune system healthy. This helps you fight infection.  · Lose weight, if needed. Excess weight can reduce air circulation around your vagina. This can increase your risk of infection.  · Exercise regularly. Regular activity helps keep your body healthy.  · Take antibiotics only as directed. Antibiotics can change the normal chemical balance in the vagina.    Clothing  · Dont sit in wet clothes. Yeast thrives  when its warm and damp.  · Dont wear tight pants. And dont wear tights, leggings, or hose without a cotton crotch. These types of clothing trap warmth and moisture.  · Wear cotton underwear. Cotton lets air circulate around the vagina.  Symptoms of vaginitis  · Irritation, swelling, or itching of the genital area  · Vaginal discharge  · Bad vaginal odor  · Pain or burning during urination   Date Last Reviewed: 12/1/2016 © 2000-2017 [employer]. [address] [place] [id number]. All rights reserved. This information is not intended as a substitute for professional medical care. Always follow your healthcare professional's instructions.        Understanding Fertility Problems: The Womans Evaluation    To help your doctor look for the cause of fertility issues, you will have an evaluation. It will include a medical history, physical exam, and some basic tests. If needed, your doctor may also suggest procedures. These allow him or her to look at your reproductive organs.  Medical history  Your doctor will ask about your health and lifestyle. Youll also be asked about factors that can affect your ability to get pregnant such as how often you have sex, your menstrual cycle and any medicines or herbs you take. Be sure to mention any prior pregnancies or surgeries. You should also mention if youve had any pelvic infections or sexually transmitted infections.  Physical exam  A physical exam helps your doctor learn about your health. It includes a pelvic exam to check for swelling, infection, or other problems. Your hormone function is also checked. To do this, your doctor looks at your breast development, body fat, and hair distribution.  Basic tests  Your evaluation will likely include some basic tests. These include blood tests. In some cases, it also includes tests that check the health of your cervix.  · Blood tests can be used to check the following factors:  ¨ Hormone levels (FSH, LH, AMH,  estrogen, and progesterone, prolactin)  ¨ Blood sugar and insulin levels  ¨ Tests for current or prior pelvic infection  ¨ Thyroid function  · Cervical cultures are samples taken from the cervix using a sterile swab. The sample is then sent to a lab and checked for signs of infections that can affect fertility.  Imaging tests  Your doctor may recommend that you have imaging tests. These show the reproductive organs in more detail. These tests may be done in the doctors office or in a hospital or surgery center. In most cases, they cause little or no discomfort.  · HSG (hysterosalpingogram). This is an X-ray test. It is used to view the shape of the uterus and make sure the fallopian tubes are open. During the test, contrast fluid is placed in the uterus and fallopian tubes. The contrast makes it easier to see problems on the X-rays.  · Ultrasound. This uses painless sound waves to make images of internal organs. This can help show problems with the ovaries or uterine lining.  · Sonohysterogram. This is an ultrasound done with sterile saltwater (saline) solution placed in the uterus. The saline makes it easier to view the inside of the uterus.  Other tests  If needed, your doctor may suggest other, less common tests. Some can be done in your doctors office. Others are done in a hospital or surgery center. For certain procedures, youll be given anesthesia to prevent discomfort.  · Hysteroscopy. This uses a small, lighted tube called a hysteroscope to view the inside of the cervix and uterus. It is usually done just after your period.  · Laparoscopy. This is a type of surgery that can help reveal problems on the surface of the reproductive organs. A thin, lighted tube device called a laparoscope is inserted into the body. Your doctor then views the reproductive organs through the scope.  Date Last Reviewed: 5/21/2015  © 0732-8025 [employer]. [address] [place] [address], [place] [address]. All rights  reserved. This information is not intended as a substitute for professional medical care. Always follow your healthcare professional's instructions.

## 2019-11-07 LAB
BACTERIAL VAGINOSIS DNA: NEGATIVE
C TRACH DNA SPEC QL NAA+PROBE: NOT DETECTED
CANDIDA GLABRATA DNA: NEGATIVE
CANDIDA KRUSEI DNA: NEGATIVE
CANDIDA RRNA VAG QL PROBE: NEGATIVE
N GONORRHOEA DNA SPEC QL NAA+PROBE: NOT DETECTED
T VAGINALIS RRNA GENITAL QL PROBE: NEGATIVE

## 2019-11-13 LAB
HPV HR 12 DNA CVX QL NAA+PROBE: POSITIVE
HPV16 AG SPEC QL: NEGATIVE
HPV18 DNA SPEC QL NAA+PROBE: NEGATIVE

## 2019-11-20 ENCOUNTER — PATIENT MESSAGE (OUTPATIENT)
Dept: OBSTETRICS AND GYNECOLOGY | Facility: CLINIC | Age: 29
End: 2019-11-20

## 2019-11-20 ENCOUNTER — TELEPHONE (OUTPATIENT)
Dept: OBSTETRICS AND GYNECOLOGY | Facility: CLINIC | Age: 29
End: 2019-11-20

## 2019-11-20 DIAGNOSIS — B37.9 YEAST INFECTION: Primary | ICD-10-CM

## 2019-11-20 RX ORDER — FLUCONAZOLE 150 MG/1
150 TABLET ORAL DAILY
Qty: 1 TABLET | Refills: 0 | Status: SHIPPED | OUTPATIENT
Start: 2019-11-20 | End: 2019-11-21

## 2019-11-20 NOTE — TELEPHONE ENCOUNTER
Attempted to call pt no answer Lm for pt to call office   Sent message through portal     ----- Message from Brenda Castillo sent at 11/20/2019 11:50 AM CST -----  Contact: Self/  [phone]  Type: Patient Call Back    Who called:  Patient    What is the request in detail:  Patient returned staffs call.  Thank you    Would the patient rather a call back or a response via [facility]? Call back    Best call back number:   [phone]

## 2019-11-20 NOTE — TELEPHONE ENCOUNTER
----- Message from Eileen Connors MA sent at 11/20/2019  1:58 PM CST -----  Contact: Self  Pt states she's on antibiotics wants to know if we can send something in for a yeast infection advised her to come in or try monistat OTC but she said that christa her and she figured since its in the system she's taking amitotics she would see if she can get it.   ----- Message -----  From: Mandy Duran  Sent: 11/20/2019   1:49 PM CST  To: [facility] Staff    Type: Patient Call Back    Who called:Evan    What is the request in detail:Patient returned call to Eileen for diflucan. Please call to advise    Can the clinic reply by [facility]?    Would the patient rather a call back or a response via [facility]? Call    Best call back number:[phone]

## 2019-11-20 NOTE — TELEPHONE ENCOUNTER
Attempted to call pt no answer Lm for pt to call office       ----- Message from Carrol Sheehan sent at 11/20/2019 11:36 AM CST -----  Contact: MONI CLARK  Type: Patient Call Back    Who called:MONI CLARK    What is the request in detail: Patient is requesting a call back. She states that she needs a prescription called in for her yeast infection. Please advise.     Can the clinic reply by [facility]? No    Best call back number: [phone]    Additional Information: N/A

## 2019-12-01 ENCOUNTER — OFFICE VISIT (OUTPATIENT)
Dept: URGENT CARE | Facility: CLINIC | Age: 29
End: 2019-12-01
Payer: MEDICAID

## 2019-12-01 VITALS
SYSTOLIC BLOOD PRESSURE: 133 MMHG | HEIGHT: 70 IN | RESPIRATION RATE: 18 BRPM | OXYGEN SATURATION: 100 % | HEART RATE: 68 BPM | WEIGHT: 293 LBS | BODY MASS INDEX: 41.95 KG/M2 | TEMPERATURE: 98 F | DIASTOLIC BLOOD PRESSURE: 85 MMHG

## 2019-12-01 DIAGNOSIS — B37.31 VAGINAL CANDIDIASIS: Primary | ICD-10-CM

## 2019-12-01 DIAGNOSIS — N89.8 VAGINAL DISCHARGE: ICD-10-CM

## 2019-12-01 DIAGNOSIS — S30.814A ABRASION OF VAGINA, INITIAL ENCOUNTER: ICD-10-CM

## 2019-12-01 DIAGNOSIS — R30.0 DYSURIA: ICD-10-CM

## 2019-12-01 LAB
B-HCG UR QL: NEGATIVE
BILIRUB UR QL STRIP: NEGATIVE
CTP QC/QA: YES
GLUCOSE UR QL STRIP: NEGATIVE
KETONES UR QL STRIP: NEGATIVE
LEUKOCYTE ESTERASE UR QL STRIP: POSITIVE
PH, POC UA: 6 (ref 5–8)
POC BLOOD, URINE: NEGATIVE
POC NITRATES, URINE: NEGATIVE
PROT UR QL STRIP: NEGATIVE
SP GR UR STRIP: 1.02 (ref 1–1.03)
UROBILINOGEN UR STRIP-ACNC: ABNORMAL (ref 0.1–1.1)

## 2019-12-01 PROCEDURE — 87491 CHLMYD TRACH DNA AMP PROBE: CPT | Mod: 59

## 2019-12-01 PROCEDURE — 99214 PR OFFICE/OUTPT VISIT, EST, LEVL IV, 30-39 MIN: ICD-10-PCS | Mod: 25,S$GLB,, | Performed by: PHYSICIAN ASSISTANT

## 2019-12-01 PROCEDURE — 81003 URINALYSIS AUTO W/O SCOPE: CPT | Mod: QW,S$GLB,, | Performed by: PHYSICIAN ASSISTANT

## 2019-12-01 PROCEDURE — 87481 CANDIDA DNA AMP PROBE: CPT | Mod: 59

## 2019-12-01 PROCEDURE — 81025 POCT URINE PREGNANCY: ICD-10-PCS | Mod: S$GLB,,, | Performed by: PHYSICIAN ASSISTANT

## 2019-12-01 PROCEDURE — 99214 OFFICE O/P EST MOD 30 MIN: CPT | Mod: 25,S$GLB,, | Performed by: PHYSICIAN ASSISTANT

## 2019-12-01 PROCEDURE — 87086 URINE CULTURE/COLONY COUNT: CPT

## 2019-12-01 PROCEDURE — 81025 URINE PREGNANCY TEST: CPT | Mod: S$GLB,,, | Performed by: PHYSICIAN ASSISTANT

## 2019-12-01 PROCEDURE — 81003 POCT URINALYSIS, DIPSTICK, AUTOMATED, W/O SCOPE: ICD-10-PCS | Mod: QW,S$GLB,, | Performed by: PHYSICIAN ASSISTANT

## 2019-12-01 RX ORDER — FLUCONAZOLE 150 MG/1
150 TABLET ORAL ONCE
Qty: 1 TABLET | Refills: 0 | Status: SHIPPED | OUTPATIENT
Start: 2019-12-01 | End: 2019-12-01

## 2019-12-02 LAB
BACTERIAL VAGINOSIS DNA: POSITIVE
C TRACH DNA SPEC QL NAA+PROBE: DETECTED
CANDIDA GLABRATA DNA: NEGATIVE
CANDIDA KRUSEI DNA: NEGATIVE
CANDIDA RRNA VAG QL PROBE: POSITIVE
N GONORRHOEA DNA SPEC QL NAA+PROBE: NOT DETECTED
T VAGINALIS RRNA GENITAL QL PROBE: NEGATIVE

## 2019-12-02 NOTE — PROGRESS NOTES
"Subjective:       Patient ID: Evan Velazco is a 29 y.o. female.    Vitals:  height is 5' 10" (1.778 m) and weight is 140.6 kg (310 lb) (abnormal). Her temperature is 97.9 °F (36.6 °C). Her blood pressure is 133/85 and her pulse is 68. Her respiration is 18 and oxygen saturation is 100%.     Chief Complaint: Vaginal Discharge    Ms. Velazco presents for evaluation of vaginal discomfort and discharge. She is sexually active but has not changed partners.  However she is having unprotected sex and would like STD screening.  She reports taking amoxicillin last week for URI and developed symptoms of yeast infection.  She called her doctor and was prescribed Diflucan which she took, but continued to take amoxicillin after the Diflucan.  She reports of thick white vaginal discharge, but no current itching.     Vaginal Discharge   The patient's primary symptoms include vaginal discharge. The patient's pertinent negatives include no missed menses or pelvic pain. The current episode started in the past 7 days (3 DAYS ). The problem occurs constantly. The problem has been gradually worsening. The pain is moderate. The problem affects both sides. She is not pregnant. Associated symptoms include dysuria. Pertinent negatives include no abdominal pain, back pain, chills, fever, frequency, hematuria, nausea, rash, urgency or vomiting. The vaginal discharge was white, milky and thick. There has been no bleeding. She has not been passing clots. She has not been passing tissue. The symptoms are aggravated by urinating. She has tried nothing for the symptoms. She is sexually active. No, her partner does not have an STD. She uses nothing for contraception. Her menstrual history has been irregular. Her past medical history is significant for an STD and vaginosis. There is no history of ovarian cysts.       Constitution: Negative for chills and fever.   Neck: Negative for painful lymph nodes.   Gastrointestinal: Negative for abdominal " pain, nausea and vomiting.   Genitourinary: Positive for dysuria, vaginal pain and vaginal discharge. Negative for frequency, urgency, urine decreased, hematuria, history of kidney stones, painful menstruation, irregular menstruation, missed menses, heavy menstrual bleeding, ovarian cysts, genital trauma, vaginal bleeding, vaginal odor, painful intercourse, genital sore, painful ejaculation and pelvic pain.   Musculoskeletal: Negative for back pain.   Skin: Negative for rash and lesion.   Hematologic/Lymphatic: Negative for swollen lymph nodes.       Objective:      Physical Exam   Constitutional: She is oriented to person, place, and time. She appears well-developed and well-nourished.   HENT:   Head: Normocephalic and atraumatic.   Right Ear: External ear normal.   Left Ear: External ear normal.   Nose: Nose normal. No nasal deformity. No epistaxis.   Mouth/Throat: Oropharynx is clear and moist and mucous membranes are normal.   Eyes: Lids are normal.   Neck: Trachea normal, normal range of motion and phonation normal. Neck supple.   Cardiovascular: Normal pulses.   Pulmonary/Chest: Effort normal. No stridor. She has no decreased breath sounds. She has no wheezes. She has no rhonchi. She has no rales.   Abdominal: Soft. Normal appearance and bowel sounds are normal. She exhibits no distension. There is no tenderness. There is no CVA tenderness.   Genitourinary:       Pelvic exam was performed with patient supine. There is erythema and tenderness in the vagina. No bleeding in the vagina. No foreign body in the vagina. No signs of injury around the vagina. Vaginal discharge found.   Genitourinary Comments: Small tissue tear as indicated.  No lesions or sores.  Thick white discharge noted.   Neurological: She is alert and oriented to person, place, and time.   Skin: Skin is warm, dry and intact.   Psychiatric: She has a normal mood and affect. Her speech is normal and behavior is normal. Cognition and memory are  normal.   Nursing note and vitals reviewed.        Results for orders placed or performed in visit on 12/01/19   POCT Urinalysis, Dipstick, Automated, W/O Scope   Result Value Ref Range    POC Blood, Urine Negative Negative    POC Bilirubin, Urine Negative Negative    POC Urobilinogen, Urine norm 0.1 - 1.1    POC Ketones, Urine Negative Negative    POC Protein, Urine Negative Negative    POC Nitrates, Urine Negative Negative    POC Glucose, Urine Negative Negative    pH, UA 6.0 5 - 8    POC Specific Gravity, Urine 1.020 (A) 1.003 - 1.029    POC Leukocytes, Urine Positive (A) Negative   POCT urine pregnancy   Result Value Ref Range    POC Preg Test, Ur Negative Negative     Acceptable Yes        Assessment:       1. Vaginal candidiasis    2. Vaginal discharge    3. Dysuria        Plan:         Vaginal candidiasis  -     C. trachomatis/N. gonorrhoeae by AMP DNA [facility]; Urine  -     Bv, Trich, Candida by DNA Probe (Swab Only)    Vaginal discharge  -     POCT Urinalysis, Dipstick, Automated, W/O Scope  -     POCT urine pregnancy    Dysuria  -     Urine culture    Other orders  -     fluconazole (DIFLUCAN) 150 MG Tab; Take 1 tablet (150 mg total) by mouth once. for 1 dose  Dispense: 1 tablet; Refill: 0      Patient Instructions   PLEASE READ YOUR DISCHARGE INSTRUCTIONS ENTIRELY AS IT CONTAINS IMPORTANT INFORMATION.  - Rest.    - Drink plenty of fluids.    - Tylenol or Ibuprofen as directed as needed for fever/pain.    - Follow up with your PCP or specialty clinic as directed in the next 1-2 weeks if not improved or as needed.  You can call [phone] to schedule an appointment with the appropriate provider.    - If you were prescribed antibiotics, please take them to completion.  - If you were prescribed a narcotic medication, do not drive or operate heavy equipment or machinery while taking these medications.  - If you  smoke, please stop smoking.  -You must understand that you've received an Urgent  Care treatment only and that you may be released before all your medical problems are known or treated. You, the patient, will    arrange for follow up care as instructed.  - Please return to Urgent Care or to the Emergency Department if your symptoms worsen.    Patient aware and verbalized understanding.  Candida Vaginal Infection    You have a Candida vaginal infection. This is also known as a yeast infection. It is most often caused by a type of yeast (fungus) called Candida. Candida are normally found in the vagina. But if they increase in number, this can lead to infection and cause symptoms.  Symptoms of a yeast infection can include:  · Clumpy or thin, white discharge, which may look like cottage cheese  · Itching or burning  · Burning with urination  Certain factors can make a yeast infection more likely. These can include:  · Taking certain medicines, such as antibiotics or birth control pills  · Pregnancy  · Diabetes  · Weakened immune system  A yeast infection is most often treated with antifungal medicine. This may be given as a vaginal cream or pills you take by mouth. Treatment may last for about 1 to 7 days. Women with severe or recurrent infections may need longer courses of treatment.  Home care  · If youre prescribed medicine, be sure to use it as directed. Finish all of the medicine, even if your symptoms go away. Note: Dont try to treat yourself using over-the-counter products without talking to your provider first. He or she will let you know if this is a good option for you.  · Ask your provider what steps you can take to help reduce your risk of having a yeast infection in the future.  Follow-up care  Follow up with your healthcare provider, or as directed.  When to seek medical advice  Call your healthcare provider right away if:  · You have a fever of 100.4ºF (38ºC) or higher, or as directed by your provider.  · Your symptoms worsen, or they dont go away within a few days of starting  treatment.  · You have new pain in the lower belly or pelvic region.  · You have side effects that bother you or a reaction to the cream or pills youre prescribed.  · You or any partners you have sex with have new symptoms, such as a rash, joint pain, or sores.  Date Last Reviewed: 7/30/2015  © 1541-2895 [employer]. [address] [place] [address]. All rights reserved. This information is not intended as a substitute for professional medical care. Always follow your healthcare professional's instructions.

## 2019-12-02 NOTE — PATIENT INSTRUCTIONS
PLEASE READ YOUR DISCHARGE INSTRUCTIONS ENTIRELY AS IT CONTAINS IMPORTANT INFORMATION.  - Rest.    - Drink plenty of fluids.    - Tylenol or Ibuprofen as directed as needed for fever/pain.    - Follow up with your PCP or specialty clinic as directed in the next 1-2 weeks if not improved or as needed.  You can call [phone] to schedule an appointment with the appropriate provider.    - If you were prescribed antibiotics, please take them to completion.  - If you were prescribed a narcotic medication, do not drive or operate heavy equipment or machinery while taking these medications.  - If you  smoke, please stop smoking.  -You must understand that you've received an Urgent Care treatment only and that you may be released before all your medical problems are known or treated. You, the patient, will    arrange for follow up care as instructed.  - Please return to Urgent Care or to the Emergency Department if your symptoms worsen.    Patient aware and verbalized understanding.  Candida Vaginal Infection    You have a Candida vaginal infection. This is also known as a yeast infection. It is most often caused by a type of yeast (fungus) called Candida. Candida are normally found in the vagina. But if they increase in number, this can lead to infection and cause symptoms.  Symptoms of a yeast infection can include:  · Clumpy or thin, white discharge, which may look like cottage cheese  · Itching or burning  · Burning with urination  Certain factors can make a yeast infection more likely. These can include:  · Taking certain medicines, such as antibiotics or birth control pills  · Pregnancy  · Diabetes  · Weakened immune system  A yeast infection is most often treated with antifungal medicine. This may be given as a vaginal cream or pills you take by mouth. Treatment may last for about 1 to 7 days. Women with severe or recurrent infections may need longer courses of treatment.  Home care  · If youre prescribed  medicine, be sure to use it as directed. Finish all of the medicine, even if your symptoms go away. Note: Dont try to treat yourself using over-the-counter products without talking to your provider first. He or she will let you know if this is a good option for you.  · Ask your provider what steps you can take to help reduce your risk of having a yeast infection in the future.  Follow-up care  Follow up with your healthcare provider, or as directed.  When to seek medical advice  Call your healthcare provider right away if:  · You have a fever of 100.4ºF (38ºC) or higher, or as directed by your provider.  · Your symptoms worsen, or they dont go away within a few days of starting treatment.  · You have new pain in the lower belly or pelvic region.  · You have side effects that bother you or a reaction to the cream or pills youre prescribed.  · You or any partners you have sex with have new symptoms, such as a rash, joint pain, or sores.  Date Last Reviewed: 7/30/2015 © 2000-2017 [employer]. [address] [place] [address], [place] [address]. All rights reserved. This information is not intended as a substitute for professional medical care. Always follow your healthcare professional's instructions.

## 2019-12-03 ENCOUNTER — TELEPHONE (OUTPATIENT)
Dept: URGENT CARE | Facility: CLINIC | Age: 29
End: 2019-12-03

## 2019-12-03 DIAGNOSIS — A74.9 CHLAMYDIA: Primary | ICD-10-CM

## 2019-12-03 DIAGNOSIS — B96.89 BACTERIAL VAGINOSIS: ICD-10-CM

## 2019-12-03 DIAGNOSIS — N76.0 BACTERIAL VAGINOSIS: ICD-10-CM

## 2019-12-03 LAB
BACTERIA UR CULT: NORMAL
BACTERIA UR CULT: NORMAL

## 2019-12-03 RX ORDER — AZITHROMYCIN 500 MG/1
1000 TABLET, FILM COATED ORAL ONCE
Qty: 2 TABLET | Refills: 0 | Status: SHIPPED | OUTPATIENT
Start: 2019-12-03 | End: 2019-12-03

## 2019-12-03 RX ORDER — METRONIDAZOLE 500 MG/1
500 TABLET ORAL 2 TIMES DAILY
Qty: 14 TABLET | Refills: 0 | Status: SHIPPED | OUTPATIENT
Start: 2019-12-03 | End: 2019-12-10

## 2019-12-03 NOTE — TELEPHONE ENCOUNTER
Left message to review BV swab- patient was put on diflucan only- will need coverage for bv and chlamydia

## 2019-12-03 NOTE — TELEPHONE ENCOUNTER
reveiwed results with patient- advised will send in treatment for bv and chlamydia- start with azithromycin then next day start flagyl. Do not drink etoh on flagyl and up to a week after

## 2019-12-04 ENCOUNTER — TELEPHONE (OUTPATIENT)
Dept: URGENT CARE | Facility: CLINIC | Age: 29
End: 2019-12-04

## 2019-12-31 ENCOUNTER — TELEPHONE (OUTPATIENT)
Dept: OTOLARYNGOLOGY | Facility: CLINIC | Age: 29
End: 2019-12-31

## 2019-12-31 ENCOUNTER — PATIENT MESSAGE (OUTPATIENT)
Dept: OTOLARYNGOLOGY | Facility: CLINIC | Age: 29
End: 2019-12-31

## 2019-12-31 NOTE — TELEPHONE ENCOUNTER
----- Message from Ilana Muñoz sent at 12/31/2019  1:24 PM CST -----  Contact: pt  Pt needs to be seen for sinus issues . Please give pt a call back at [phone] to schedule.

## 2020-09-25 ENCOUNTER — OFFICE VISIT (OUTPATIENT)
Dept: URGENT CARE | Facility: CLINIC | Age: 30
End: 2020-09-25
Payer: MEDICAID

## 2020-09-25 VITALS
OXYGEN SATURATION: 96 % | SYSTOLIC BLOOD PRESSURE: 144 MMHG | RESPIRATION RATE: 18 BRPM | HEART RATE: 85 BPM | TEMPERATURE: 98 F | WEIGHT: 293 LBS | HEIGHT: 70 IN | DIASTOLIC BLOOD PRESSURE: 81 MMHG | BODY MASS INDEX: 41.95 KG/M2

## 2020-09-25 DIAGNOSIS — B34.9 VIRAL SYNDROME: Primary | ICD-10-CM

## 2020-09-25 LAB
CTP QC/QA: YES
SARS-COV-2 RDRP RESP QL NAA+PROBE: NEGATIVE

## 2020-09-25 PROCEDURE — 99214 OFFICE O/P EST MOD 30 MIN: CPT | Mod: S$GLB,,, | Performed by: PHYSICIAN ASSISTANT

## 2020-09-25 PROCEDURE — 87635 SARS-COV-2 COVID-19 AMP PRB: CPT | Mod: QW,S$GLB,, | Performed by: PHYSICIAN ASSISTANT

## 2020-09-25 PROCEDURE — 87635: ICD-10-PCS | Mod: QW,S$GLB,, | Performed by: PHYSICIAN ASSISTANT

## 2020-09-25 PROCEDURE — 99214 PR OFFICE/OUTPT VISIT, EST, LEVL IV, 30-39 MIN: ICD-10-PCS | Mod: S$GLB,,, | Performed by: PHYSICIAN ASSISTANT

## 2020-09-25 RX ORDER — IBUPROFEN 600 MG/1
600 TABLET ORAL 3 TIMES DAILY
Qty: 30 TABLET | Refills: 0 | Status: SHIPPED | OUTPATIENT
Start: 2020-09-25 | End: 2020-10-05

## 2020-09-25 NOTE — PATIENT INSTRUCTIONS
"-Take tylenol/motrin as needed for pain.  Rest and stay hydrated.    Please follow up with your primary care provider within 2-5 days if your signs and symptoms have not resolved or worsen.     If your condition worsens or fails to improve we recommend that you receive another evaluation at the emergency room immediately or contact your primary medical clinic to discuss your concerns.   You must understand that you have received an Urgent Care treatment only and that you may be released before all of your medical problems are known or treated. You, the patient, will arrange for follow up care as instructed.         Viral Syndrome (Adult)  A viral illness may cause a number of symptoms. The symptoms depend on the part of the body that the virus affects. If it settles in your nose, throat, and lungs, it may cause cough, sore throat, congestion, and sometimes headache. If it settles in your stomach and intestinal tract, it may cause vomiting and diarrhea. Sometimes it causes vague symptoms like "aching all over," feeling tired, loss of appetite, or fever.  A viral illness usually lasts 1 to 2 weeks, but sometimes it lasts longer. In some cases, a more serious infection can look like a viral syndrome in the first few days of the illness. You may need another exam and additional tests to know the difference. Watch for the warning signs listed below.  Home care  Follow these guidelines for taking care of yourself at home:  · If symptoms are severe, rest at home for the first 2 to 3 days.  · Stay away from cigarette smoke - both your smoke and the smoke from others.  · You may use over-the-counter acetaminophen or ibuprofen for fever, muscle aching, and headache, unless another medicine was prescribed for this. If you have chronic liver or kidney disease or ever had a stomach ulcer or GI bleeding, talk with your doctor before using these medicines. No one who is younger than 18 and ill with a fever should take aspirin. It " may cause severe disease or death.  · Your appetite may be poor, so a light diet is fine. Avoid dehydration by drinking 8 to 12 8-ounce glasses of fluids each day. This may include water; orange juice; lemonade; apple, grape, and cranberry juice; clear fruit drinks; electrolyte replacement and sports drinks; and decaffeinated teas and coffee. If you have been diagnosed with a kidney disease, ask your doctor how much and what types of fluids you should drink to prevent dehydration. If you have kidney disease, drinking too much fluid can cause it build up in the your body and be dangerous to your health.  · Over-the-counter remedies won't shorten the length of the illness but may be helpful for cough, sore throat; and nasal and sinus congestion. Don't use decongestants if you have high blood pressure.  Follow-up care  Follow up with your healthcare provider if you do not improve over the next week.  Call 911  Get emergency medical care if any of the following occur:  · Convulsion  · Feeling weak, dizzy, or like you are going to faint  · Chest pain, shortness of breath, wheezing, or difficulty breathing  When to seek medical advice  Call your healthcare provider right away if any of these occur:  · Cough with lots of colored sputum (mucus) or blood in your sputum  · Chest pain, shortness of breath, wheezing, or difficulty breathing  · Severe headache; face, neck, or ear pain  · Severe, constant pain in the lower right side of your belly (abdominal)  · Continued vomiting (cant keep liquids down)  · Frequent diarrhea (more than 5 times a day); blood (red or black color) or mucus in diarrhea  · Feeling weak, dizzy, or like you are going to faint  · Extreme thirst  · Fever of 100.4°F (38°C) or higher, or as directed by your healthcare provider  Date Last Reviewed: 9/25/2015  © 4167-3347 [employer]. [address] [place] [address], [place] [address]. All rights reserved. This information is not intended as a  substitute for professional medical care. Always follow your healthcare professional's instructions.

## 2020-09-25 NOTE — PROGRESS NOTES
"Subjective:       Patient ID: Evan Velazco is a 30 y.o. female.    Vitals:  height is 5' 10" (1.778 m) and weight is 140.6 kg (310 lb) (abnormal). Her temperature is 97.7 °F (36.5 °C). Her blood pressure is 144/81 (abnormal) and her pulse is 85. Her respiration is 18 and oxygen saturation is 96%.     Chief Complaint: Generalized Body Aches    Patient reports body aches, fever, chills, and body aches that started yesterday.     Cough  This is a new problem. The current episode started yesterday. The problem has been unchanged. The problem occurs every few minutes. The cough is non-productive. Associated symptoms include chills. Pertinent negatives include no ear pain, eye redness, fever, hemoptysis, myalgias, rash, sore throat, shortness of breath or wheezing. Nothing aggravates the symptoms. Treatments tried: nsaid. The treatment provided mild relief.       Constitution: Positive for chills and fatigue. Negative for sweating and fever.   HENT: Positive for congestion. Negative for ear pain, sinus pain, sinus pressure, sore throat and voice change.    Neck: Negative for painful lymph nodes.   Eyes: Negative for eye redness.   Respiratory: Positive for cough. Negative for chest tightness, sputum production, bloody sputum, COPD, shortness of breath, stridor, wheezing and asthma.    Gastrointestinal: Positive for diarrhea. Negative for nausea and vomiting.   Musculoskeletal: Negative for muscle ache.   Skin: Negative for rash.   Allergic/Immunologic: Negative for seasonal allergies and asthma.   Hematologic/Lymphatic: Negative for swollen lymph nodes.       Objective:      Physical Exam   Constitutional: She is oriented to person, place, and time. She appears well-developed. She is cooperative.  Non-toxic appearance. She does not appear ill. No distress.   HENT:   Head: Normocephalic and atraumatic.   Ears:   Right Ear: Hearing, tympanic membrane, external ear and ear canal normal.   Left Ear: Hearing, tympanic " membrane, external ear and ear canal normal.   Nose: Mucosal edema present. No rhinorrhea. Right sinus exhibits no maxillary sinus tenderness and no frontal sinus tenderness. Left sinus exhibits no maxillary sinus tenderness and no frontal sinus tenderness.   Mouth/Throat: Oropharynx is clear and moist and mucous membranes are normal. No oropharyngeal exudate, posterior oropharyngeal edema or posterior oropharyngeal erythema.   Eyes: Conjunctivae, EOM and lids are normal. Right eye exhibits no discharge. Left eye exhibits no discharge. No scleral icterus.   Neck: Trachea normal, normal range of motion, full passive range of motion without pain and phonation normal. Neck supple.   Cardiovascular: Normal rate, regular rhythm and normal heart sounds. Exam reveals no gallop.   No murmur heard.  Pulmonary/Chest: Effort normal and breath sounds normal. No respiratory distress. She has no decreased breath sounds. She has no wheezes. She has no rhonchi. She has no rales.   Musculoskeletal:         General: No deformity.   Lymphadenopathy:        Head (right side): No submandibular and no tonsillar adenopathy present.        Head (left side): No submandibular and no tonsillar adenopathy present.   Neurological: She is alert and oriented to person, place, and time. Coordination normal.   Skin: Skin is warm, dry, intact, not diaphoretic and not pale. Psychiatric: Her speech is normal and behavior is normal. Judgment and thought content normal.   Nursing note and vitals reviewed.        Assessment:       1. Viral syndrome        Office Visit on 09/25/2020   Component Date Value Ref Range Status    POC Rapid COVID 09/25/2020 Negative  Negative Final     Acceptable 09/25/2020 Yes   Final     Plan:         Viral syndrome  -     POCT COVID-19 Rapid Screening  -     ibuprofen (ADVIL,MOTRIN) 600 MG tablet; Take 1 tablet (600 mg total) by mouth 3 (three) times daily. for 30 doses  Dispense: 30 tablet; Refill:  "0      Patient Instructions   -Take tylenol/motrin as needed for pain.  Rest and stay hydrated.    Please follow up with your primary care provider within 2-5 days if your signs and symptoms have not resolved or worsen.     If your condition worsens or fails to improve we recommend that you receive another evaluation at the emergency room immediately or contact your primary medical clinic to discuss your concerns.   You must understand that you have received an Urgent Care treatment only and that you may be released before all of your medical problems are known or treated. You, the patient, will arrange for follow up care as instructed.         Viral Syndrome (Adult)  A viral illness may cause a number of symptoms. The symptoms depend on the part of the body that the virus affects. If it settles in your nose, throat, and lungs, it may cause cough, sore throat, congestion, and sometimes headache. If it settles in your stomach and intestinal tract, it may cause vomiting and diarrhea. Sometimes it causes vague symptoms like "aching all over," feeling tired, loss of appetite, or fever.  A viral illness usually lasts 1 to 2 weeks, but sometimes it lasts longer. In some cases, a more serious infection can look like a viral syndrome in the first few days of the illness. You may need another exam and additional tests to know the difference. Watch for the warning signs listed below.  Home care  Follow these guidelines for taking care of yourself at home:  · If symptoms are severe, rest at home for the first 2 to 3 days.  · Stay away from cigarette smoke - both your smoke and the smoke from others.  · You may use over-the-counter acetaminophen or ibuprofen for fever, muscle aching, and headache, unless another medicine was prescribed for this. If you have chronic liver or kidney disease or ever had a stomach ulcer or GI bleeding, talk with your doctor before using these medicines. No one who is younger than 18 and ill with a " fever should take aspirin. It may cause severe disease or death.  · Your appetite may be poor, so a light diet is fine. Avoid dehydration by drinking 8 to 12 8-ounce glasses of fluids each day. This may include water; orange juice; lemonade; apple, grape, and cranberry juice; clear fruit drinks; electrolyte replacement and sports drinks; and decaffeinated teas and coffee. If you have been diagnosed with a kidney disease, ask your doctor how much and what types of fluids you should drink to prevent dehydration. If you have kidney disease, drinking too much fluid can cause it build up in the your body and be dangerous to your health.  · Over-the-counter remedies won't shorten the length of the illness but may be helpful for cough, sore throat; and nasal and sinus congestion. Don't use decongestants if you have high blood pressure.  Follow-up care  Follow up with your healthcare provider if you do not improve over the next week.  Call 911  Get emergency medical care if any of the following occur:  · Convulsion  · Feeling weak, dizzy, or like you are going to faint  · Chest pain, shortness of breath, wheezing, or difficulty breathing  When to seek medical advice  Call your healthcare provider right away if any of these occur:  · Cough with lots of colored sputum (mucus) or blood in your sputum  · Chest pain, shortness of breath, wheezing, or difficulty breathing  · Severe headache; face, neck, or ear pain  · Severe, constant pain in the lower right side of your belly (abdominal)  · Continued vomiting (cant keep liquids down)  · Frequent diarrhea (more than 5 times a day); blood (red or black color) or mucus in diarrhea  · Feeling weak, dizzy, or like you are going to faint  · Extreme thirst  · Fever of 100.4°F (38°C) or higher, or as directed by your healthcare provider  Date Last Reviewed: 9/25/2015  © 9078-5902 [employer]. [address] [place] [address], [place] [address]. All rights reserved. This information  is not intended as a substitute for professional medical care. Always follow your healthcare professional's instructions.

## 2021-04-15 ENCOUNTER — PATIENT MESSAGE (OUTPATIENT)
Dept: RESEARCH | Facility: HOSPITAL | Age: 31
End: 2021-04-15

## 2023-03-01 ENCOUNTER — HOSPITAL ENCOUNTER (EMERGENCY)
Facility: HOSPITAL | Age: 33
Discharge: HOME | End: 2023-03-01
Attending: EMERGENCY MEDICINE
Payer: MEDICAID

## 2023-03-01 VITALS
WEIGHT: 293 LBS | SYSTOLIC BLOOD PRESSURE: 145 MMHG | DIASTOLIC BLOOD PRESSURE: 90 MMHG | HEIGHT: 70 IN | OXYGEN SATURATION: 100 % | BODY MASS INDEX: 41.95 KG/M2 | TEMPERATURE: 98 F | RESPIRATION RATE: 18 BRPM | HEART RATE: 61 BPM

## 2023-03-01 DIAGNOSIS — R51.9 NONINTRACTABLE HEADACHE, UNSPECIFIED CHRONICITY PATTERN, UNSPECIFIED HEADACHE TYPE: ICD-10-CM

## 2023-03-01 DIAGNOSIS — R42 DIZZINESS: ICD-10-CM

## 2023-03-01 DIAGNOSIS — R00.2 PALPITATIONS: ICD-10-CM

## 2023-03-01 DIAGNOSIS — I10 HYPERTENSION, UNSPECIFIED TYPE: ICD-10-CM

## 2023-03-01 DIAGNOSIS — R07.89 CHEST TIGHTNESS: ICD-10-CM

## 2023-03-01 DIAGNOSIS — F41.9 ANXIETY: ICD-10-CM

## 2023-03-01 DIAGNOSIS — R07.9 CHEST PAIN: Primary | ICD-10-CM

## 2023-03-01 LAB
ALBUMIN SERPL-MCNC: 4.1 G/DL (ref 3.3–5.5)
ALP SERPL-CCNC: 90 U/L (ref 42–141)
B-HCG UR QL: NEGATIVE
BILIRUB SERPL-MCNC: 0.6 MG/DL (ref 0.2–1.6)
BUN SERPL-MCNC: 11 MG/DL (ref 7–22)
CALCIUM SERPL-MCNC: 9.5 MG/DL (ref 8–10.3)
CHLORIDE SERPL-SCNC: 107 MMOL/L (ref 98–108)
CREAT SERPL-MCNC: 0.7 MG/DL (ref 0.6–1.2)
CTP QC/QA: YES
CTP QC/QA: YES
GLUCOSE SERPL-MCNC: 84 MG/DL (ref 73–118)
INFLUENZA A ANTIGEN, POC: NEGATIVE
INFLUENZA B ANTIGEN, POC: NEGATIVE
POC ALT (SGPT): 21 U/L (ref 10–47)
POC AST (SGOT): 27 U/L (ref 11–38)
POC B-TYPE NATRIURETIC PEPTIDE: 27.6 PG/ML (ref 0–100)
POC CARDIAC TROPONIN I: 0 NG/ML (ref 0–0.08)
POC TCO2: 23 MMOL/L (ref 18–33)
POCT GLUCOSE: 71 MG/DL (ref 70–110)
POTASSIUM BLD-SCNC: 3.8 MMOL/L (ref 3.6–5.1)
PROTEIN, POC: 8.5 G/DL (ref 6.4–8.1)
SAMPLE: NORMAL
SARS-COV-2 RDRP RESP QL NAA+PROBE: NEGATIVE
SODIUM BLD-SCNC: 136 MMOL/L (ref 128–145)

## 2023-03-01 PROCEDURE — 96374 THER/PROPH/DIAG INJ IV PUSH: CPT | Mod: ER

## 2023-03-01 PROCEDURE — 93010 ELECTROCARDIOGRAM REPORT: CPT | Mod: ,,, | Performed by: INTERNAL MEDICINE

## 2023-03-01 PROCEDURE — 83880 ASSAY OF NATRIURETIC PEPTIDE: CPT | Mod: ER

## 2023-03-01 PROCEDURE — 87804 INFLUENZA ASSAY W/OPTIC: CPT | Mod: ER

## 2023-03-01 PROCEDURE — 25000003 PHARM REV CODE 250: Mod: ER | Performed by: EMERGENCY MEDICINE

## 2023-03-01 PROCEDURE — 82962 GLUCOSE BLOOD TEST: CPT | Mod: ER

## 2023-03-01 PROCEDURE — 96361 HYDRATE IV INFUSION ADD-ON: CPT | Mod: ER

## 2023-03-01 PROCEDURE — 81025 URINE PREGNANCY TEST: CPT | Mod: ER | Performed by: EMERGENCY MEDICINE

## 2023-03-01 PROCEDURE — 84484 ASSAY OF TROPONIN QUANT: CPT | Mod: ER

## 2023-03-01 PROCEDURE — 80053 COMPREHEN METABOLIC PANEL: CPT | Mod: ER

## 2023-03-01 PROCEDURE — 93005 ELECTROCARDIOGRAM TRACING: CPT | Mod: ER

## 2023-03-01 PROCEDURE — 99284 EMERGENCY DEPT VISIT MOD MDM: CPT | Mod: 25,ER

## 2023-03-01 PROCEDURE — 85025 COMPLETE CBC W/AUTO DIFF WBC: CPT | Mod: ER

## 2023-03-01 PROCEDURE — 93010 EKG 12-LEAD: ICD-10-PCS | Mod: ,,, | Performed by: INTERNAL MEDICINE

## 2023-03-01 PROCEDURE — 63600175 PHARM REV CODE 636 W HCPCS: Mod: ER | Performed by: EMERGENCY MEDICINE

## 2023-03-01 RX ORDER — ACETAMINOPHEN 500 MG
1000 TABLET ORAL
Status: COMPLETED | OUTPATIENT
Start: 2023-03-01 | End: 2023-03-01

## 2023-03-01 RX ORDER — HYDROXYZINE PAMOATE 25 MG/1
25 CAPSULE ORAL 3 TIMES DAILY PRN
Qty: 15 CAPSULE | Refills: 0 | Status: SHIPPED | OUTPATIENT
Start: 2023-03-01 | End: 2023-06-10

## 2023-03-01 RX ORDER — ALPRAZOLAM 0.25 MG/1
0.5 TABLET ORAL
Status: COMPLETED | OUTPATIENT
Start: 2023-03-01 | End: 2023-03-01

## 2023-03-01 RX ORDER — AMLODIPINE BESYLATE 5 MG/1
5 TABLET ORAL
Status: COMPLETED | OUTPATIENT
Start: 2023-03-01 | End: 2023-03-01

## 2023-03-01 RX ORDER — KETOROLAC TROMETHAMINE 30 MG/ML
15 INJECTION, SOLUTION INTRAMUSCULAR; INTRAVENOUS
Status: COMPLETED | OUTPATIENT
Start: 2023-03-01 | End: 2023-03-01

## 2023-03-01 RX ADMIN — ALPRAZOLAM 0.5 MG: 0.25 TABLET ORAL at 03:03

## 2023-03-01 RX ADMIN — SODIUM CHLORIDE 1000 ML: 9 INJECTION, SOLUTION INTRAVENOUS at 03:03

## 2023-03-01 RX ADMIN — ACETAMINOPHEN 1000 MG: 500 TABLET, FILM COATED ORAL at 06:03

## 2023-03-01 RX ADMIN — AMLODIPINE BESYLATE 5 MG: 5 TABLET ORAL at 06:03

## 2023-03-01 RX ADMIN — KETOROLAC TROMETHAMINE 15 MG: 30 INJECTION, SOLUTION INTRAMUSCULAR; INTRAVENOUS at 06:03

## 2023-03-01 NOTE — Clinical Note
"Evan Blancas" Juan A was seen and treated in our emergency department on 3/1/2023.  She may return to work on 03/02/2023.       If you have any questions or concerns, please don't hesitate to call.      Farzaneh Vogel MD"

## 2023-03-01 NOTE — ED PROVIDER NOTES
Encounter Date: 3/1/2023    SCRIBE #1 NOTE: I, Emmie Bernard, am scribing for, and in the presence of,  Farzaneh Vogel MD. I have scribed the following portions of the note - Other sections scribed: HPI; ROS; PE.     History     Chief Complaint   Patient presents with    Dizziness     Complains of lightheadedness, headache, and chest tightness that started around 1000 AM.      Evan Velazco is a 32 y.o. female with Hx of Anxiety and HTN who presents to the ED for chief complaint of dizziness, headache, and palpitations that began at 10 AM today. Associated symptoms are chest tightness, neck stiffness, tingling to the bilateral hands, dry mouth, and diarrhea. Patient reports the chest tightness began around 1 PM. She states she's had similar symptoms in the past with panic attacks. Patient attempted treatment of the headache with Tylenol with no immediate relief. She denies associated vomiting. No further complaints at this time. Patient notes she is out of vistaril. She states she found out that her grandmother has breast cancer today, which may have triggered the panic attack. Patient does not use tobacco.       The history is provided by the patient. No  was used.   Review of patient's allergies indicates:  No Known Allergies  Past Medical History:   Diagnosis Date    Anxiety     Dermatitis     GERD (gastroesophageal reflux disease)     Hypertension     Pruritic disorder      History reviewed. No pertinent surgical history.  Family History   Problem Relation Age of Onset    Hypertension Mother     Eczema Sister      Social History     Tobacco Use    Smoking status: Never    Smokeless tobacco: Never   Substance Use Topics    Alcohol use: Yes     Comment: occ    Drug use: Yes     Types: Marijuana     Comment: occ     Review of Systems   Constitutional:  Negative for activity change, appetite change, chills and fever.   HENT:  Negative for congestion, rhinorrhea, sneezing and sore throat.     Respiratory:  Positive for chest tightness. Negative for cough, choking, shortness of breath and wheezing.    Cardiovascular:  Positive for palpitations. Negative for chest pain.   Gastrointestinal:  Positive for diarrhea. Negative for abdominal pain, nausea and vomiting.   Musculoskeletal:  Positive for neck stiffness.   Neurological:  Positive for dizziness and headaches. Negative for syncope and light-headedness.        Positive for tingling (hands)   All other systems reviewed and are negative.    Physical Exam     Initial Vitals   BP Pulse Resp Temp SpO2   03/01/23 1450 03/01/23 1450 03/01/23 1450 03/01/23 1453 03/01/23 1450   (!) 150/90 77 18 98.2 °F (36.8 °C) 100 %      MAP       --                Physical Exam    Nursing note and vitals reviewed.  Constitutional: Vital signs are normal. She appears well-developed and well-nourished. She is Obese . She is cooperative. She does not appear ill. No distress.   HENT:   Head: Normocephalic and atraumatic.   Mouth/Throat: Uvula is midline and mucous membranes are normal.   Eyes: Conjunctivae and lids are normal.   Neck: Neck supple.   Normal range of motion.  Cardiovascular:  Normal rate, regular rhythm, S1 normal, S2 normal and normal heart sounds.           No murmur heard.  Pulmonary/Chest: Effort normal and breath sounds normal. No respiratory distress. She has no wheezes.   Abdominal: Abdomen is soft. Bowel sounds are normal. She exhibits no distension. There is no abdominal tenderness.   Musculoskeletal:         General: No tenderness or edema.      Cervical back: Normal range of motion and neck supple.      Right lower leg: No edema.      Left lower leg: No edema.     Neurological: She is alert and oriented to person, place, and time.   Skin: Skin is warm, dry and intact.   Psychiatric: Her speech is normal and behavior is normal. Her mood appears anxious.   Fidgeting.        ED Course   Procedures  Labs Reviewed   POCT CMP - Abnormal; Notable for the  following components:       Result Value    Protein, POC 8.5 (*)     All other components within normal limits   TROPONIN ISTAT   POCT URINE PREGNANCY   POCT CBC   SARS-COV-2 RDRP GENE    Narrative:     This test utilizes isothermal nucleic acid amplification technology to detect the SARS-CoV-2 RdRp nucleic acid segment. The analytical sensitivity (limit of detection) is 500 copies/swab.     A POSITIVE result is indicative of the presence of SARS-CoV-2 RNA; clinical correlation with patient history and other diagnostic information is necessary to determine patient infection status.    A NEGATIVE result means that SARS-CoV-2 nucleic acids are not present above the limit of detection. A NEGATIVE result should be treated as presumptive. It does not rule out the possibility of COVID-19 and should not be the sole basis for treatment decisions. If COVID-19 is strongly suspected based on clinical and exposure history, re-testing using an alternate molecular assay should be considered.     This test is only for use under the Food and Drug Administration s Emergency Use Authorization (EUA).     Commercial kits are provided by [employer]. Performance characteristics of the EUA have been independently verified by [facility] Department of Pathology and Laboratory Medicine.   _________________________________________________________________   The authorized Fact Sheet for Healthcare Providers and the authorized Fact Sheet for Patients of the ID NOW COVID-19 are available on the FDA website:    https://www.fda.gov/media/243770/download      https://www.fda.gov/media/997074/download      POCT GLUCOSE   POCT CMP   POCT TROPONIN   POCT B-TYPE NATRIURETIC PEPTIDE (BNP)   POCT B-TYPE NATRIURETIC PEPTIDE (BNP)   POCT RAPID INFLUENZA A/B       EKG Readings: (Independently Interpreted)   Initial Reading: No STEMI. Rhythm: Sinus Arrhythmia. Heart Rate: 70. Ectopy: No Ectopy. Conduction: Normal. ST Segments: Normal ST  Segments. T Waves: Normal. T Waves Flipped: V1 and V2. Clinical Impression: Sinus Arrhythmia Other Impression: with juvenile pattern T wave inversions. independently interpreted by me.   ECG Results              EKG 12-lead (Final result)  Result time 03/01/23 16:36:06      Final result by Denae, [facility] (03/01/23 16:36:06)                   Narrative:    Test Reason : R07.89,    Vent. Rate : 070 BPM     Atrial Rate : 070 BPM     P-R Int : 158 ms          QRS Dur : 080 ms      QT Int : 414 ms       P-R-T Axes : 054 004 016 degrees     QTc Int : 447 ms    Normal sinus rhythm with sinus arrhythmia  Possible Left atrial enlargement  Minimal voltage criteria for LVH, may be normal variant ( R in aVL )  Borderline Abnormal ECG  When compared with ECG of 18-MAY-2019 14:31,  Nonspecific T wave abnormality now evident in Anterior leads  Confirmed by Giovany Clemente MD ([id number]) on 3/1/2023 4:35:03 PM    Referred By: [email or web address]   SELF           Confirmed By:Giovany Clemente MD                                  Imaging Results              X-Ray Chest PA And Lateral (Final result)  Result time 03/01/23 16:22:41      Final result by Sb Moon MD (03/01/23 16:22:41)                   Impression:      No acute radiographic findings in the chest.      Electronically signed by: Sb Moon MD  Date:    03/01/2023  Time:    16:22               Narrative:    EXAMINATION:  XR CHEST PA AND LATERAL    CLINICAL HISTORY:  Other chest pain    TECHNIQUE:  PA and lateral views of the chest were performed.    COMPARISON:  05/06/2018 and 9/21/18    FINDINGS:  The lungs are clear, with normal appearance of pulmonary vasculature and no pleural effusion or pneumothorax.    The cardiac silhouette is stable in size.  The hilar and mediastinal contours are unremarkable.    Visualized osseous structures show no acute abnormalities.                                       Medications   sodium chloride 0.9% bolus 1,000 mL 1,000 mL (0 mLs  Intravenous Stopped 3/1/23 1759)   ALPRAZolam tablet 0.5 mg (0.5 mg Oral Given 3/1/23 1552)   amLODIPine tablet 5 mg (5 mg Oral Given 3/1/23 1804)   ketorolac injection 15 mg (15 mg Intravenous Given 3/1/23 1803)   acetaminophen tablet 1,000 mg (1,000 mg Oral Given 3/1/23 1814)     Medical Decision Making:   History:   Old Medical Records: I decided to obtain old medical records.  Independently Interpreted Test(s):   I have ordered and independently interpreted X-rays - see prior notes.  I have ordered and independently interpreted EKG Reading(s) - see prior notes  Clinical Tests:   Lab Tests: Ordered and Reviewed  Radiological Study: Ordered and Reviewed  Medical Tests: Ordered and Reviewed  ED Management:  32 y.o. female with Hx of Anxiety and HTN presents with dizziness, headache, and palpitations that began at 10 AM today with associated chest tightness, neck stiffness, tingling to the bilateral hands, dry mouth, and diarrhea. On exam, no fever, obese, anxious, fidgeting, breath sounds are clear, heart normal, and no lower extremity edema. EKG with NSR, no arrhythmia or acute ischemic changes. Work up with no signs of ACS, acute MI, covid, flu, pneumonia, CHF, anemia, dehydration or electrolyte abnormalities. She was given xanax and frlt much better. I do feel her symptoms are due to panic attack. She normally takes vistaril but is out. Symptoms resolved with xanax. Will discharge with vistaril.        Scribe Attestation:   Scribe #1: I performed the above scribed service and the documentation accurately describes the services I performed. I attest to the accuracy of the note.            I, Dr. Farzaneh Vogel, personally performed the services described in this documentation.   All medical record entries made by the scribe were at my direction and in my presence.   I have reviewed the chart and agree that the record is accurate and complete.   Farzaneh Vogel MD.  3:56 PM 03/02/2023          Clinical Impression:   Final  diagnoses:  [R07.9] Chest pain (Primary)  [R07.89] Chest tightness  [F41.9] Anxiety  [R42] Dizziness  [R51.9] Nonintractable headache, unspecified chronicity pattern, unspecified headache type  [R00.2] Palpitations  [I10] Hypertension, unspecified type        ED Disposition Condition    Discharge Stable          ED Prescriptions       Medication Sig Dispense Start Date End Date Auth. Provider    hydrOXYzine pamoate (VISTARIL) 25 MG Cap Take 1 capsule (25 mg total) by mouth 3 (three) times daily as needed (anxiety). 15 capsule 3/1/2023 -- Farzaneh Vogel MD          Follow-up Information       Follow up With Specialties Details Why Contact Info    Irvin Nur MD Family Medicine Schedule an appointment as soon as possible for a visit   [address] [place] [id number] [phone]               Farzaneh Vogel MD  03/02/23 0734

## 2023-03-02 NOTE — DISCHARGE INSTRUCTIONS
Your ER work up is normal. Your symptoms were likely due to anxiety. Take vistaril as needed. Continue your home medications and be sure to take your blood pressure medicines every day. Talk to your doctor if you have ongoing anxiety.

## 2023-03-15 ENCOUNTER — HOSPITAL ENCOUNTER (EMERGENCY)
Facility: HOSPITAL | Age: 33
Discharge: HOME | End: 2023-03-15
Attending: EMERGENCY MEDICINE
Payer: MEDICAID

## 2023-03-15 VITALS
HEART RATE: 70 BPM | OXYGEN SATURATION: 100 % | RESPIRATION RATE: 20 BRPM | TEMPERATURE: 100 F | BODY MASS INDEX: 48.78 KG/M2 | DIASTOLIC BLOOD PRESSURE: 84 MMHG | WEIGHT: 293 LBS | SYSTOLIC BLOOD PRESSURE: 142 MMHG

## 2023-03-15 DIAGNOSIS — U07.1 COVID-19 VIRUS INFECTION: Primary | ICD-10-CM

## 2023-03-15 LAB
ALBUMIN SERPL-MCNC: 3.7 G/DL (ref 3.3–5.5)
ALP SERPL-CCNC: 87 U/L (ref 42–141)
B-HCG UR QL: NEGATIVE
BILIRUB SERPL-MCNC: 0.6 MG/DL (ref 0.2–1.6)
BUN SERPL-MCNC: 10 MG/DL (ref 7–22)
CALCIUM SERPL-MCNC: 9.2 MG/DL (ref 8–10.3)
CHLORIDE SERPL-SCNC: 105 MMOL/L (ref 98–108)
CREAT SERPL-MCNC: 0.7 MG/DL (ref 0.6–1.2)
CTP QC/QA: YES
CTP QC/QA: YES
GLUCOSE SERPL-MCNC: 80 MG/DL (ref 73–118)
INFLUENZA A ANTIGEN, POC: NEGATIVE
INFLUENZA B ANTIGEN, POC: NEGATIVE
POC ALT (SGPT): 23 U/L (ref 10–47)
POC AST (SGOT): 26 U/L (ref 11–38)
POC RAPID STREP A: NEGATIVE
POC TCO2: 28 MMOL/L (ref 18–33)
POTASSIUM BLD-SCNC: 4.3 MMOL/L (ref 3.6–5.1)
PROTEIN, POC: 7.9 G/DL (ref 6.4–8.1)
SARS-COV-2 RDRP RESP QL NAA+PROBE: POSITIVE
SODIUM BLD-SCNC: 138 MMOL/L (ref 128–145)

## 2023-03-15 PROCEDURE — 80053 COMPREHEN METABOLIC PANEL: CPT | Mod: ER

## 2023-03-15 PROCEDURE — 25000003 PHARM REV CODE 250: Mod: ER | Performed by: EMERGENCY MEDICINE

## 2023-03-15 PROCEDURE — 99284 EMERGENCY DEPT VISIT MOD MDM: CPT | Mod: ER

## 2023-03-15 PROCEDURE — 85025 COMPLETE CBC W/AUTO DIFF WBC: CPT | Mod: ER

## 2023-03-15 PROCEDURE — 81025 URINE PREGNANCY TEST: CPT | Mod: ER | Performed by: EMERGENCY MEDICINE

## 2023-03-15 PROCEDURE — 87804 INFLUENZA ASSAY W/OPTIC: CPT | Mod: 59,ER

## 2023-03-15 RX ORDER — AMLODIPINE BESYLATE 10 MG/1
10 TABLET ORAL DAILY
COMMUNITY

## 2023-03-15 RX ORDER — NIRMATRELVIR AND RITONAVIR 150-100 MG
1 KIT ORAL 2 TIMES DAILY
Qty: 10 TABLET | Refills: 0 | Status: SHIPPED | OUTPATIENT
Start: 2023-03-15 | End: 2023-03-15 | Stop reason: SDUPTHER

## 2023-03-15 RX ORDER — IBUPROFEN 600 MG/1
600 TABLET ORAL EVERY 6 HOURS PRN
Qty: 20 TABLET | Refills: 0 | Status: SHIPPED | OUTPATIENT
Start: 2023-03-15 | End: 2023-03-15 | Stop reason: SDUPTHER

## 2023-03-15 RX ORDER — NIRMATRELVIR AND RITONAVIR 150-100 MG
1 KIT ORAL 2 TIMES DAILY
Qty: 10 TABLET | Refills: 0 | Status: SHIPPED | OUTPATIENT
Start: 2023-03-15 | End: 2023-03-20

## 2023-03-15 RX ORDER — IBUPROFEN 600 MG/1
600 TABLET ORAL EVERY 6 HOURS PRN
Qty: 20 TABLET | Refills: 0 | Status: SHIPPED | OUTPATIENT
Start: 2023-03-15

## 2023-03-15 RX ORDER — BENZONATATE 200 MG/1
200 CAPSULE ORAL 3 TIMES DAILY PRN
Qty: 15 CAPSULE | Refills: 0 | Status: SHIPPED | OUTPATIENT
Start: 2023-03-15 | End: 2023-03-20

## 2023-03-15 RX ORDER — IBUPROFEN 600 MG/1
600 TABLET ORAL
Status: COMPLETED | OUTPATIENT
Start: 2023-03-15 | End: 2023-03-15

## 2023-03-15 RX ORDER — BENZONATATE 200 MG/1
200 CAPSULE ORAL 3 TIMES DAILY PRN
Qty: 15 CAPSULE | Refills: 0 | Status: SHIPPED | OUTPATIENT
Start: 2023-03-15 | End: 2023-03-15 | Stop reason: SDUPTHER

## 2023-03-15 RX ORDER — CETIRIZINE HYDROCHLORIDE, PSEUDOEPHEDRINE HYDROCHLORIDE 5; 120 MG/1; MG/1
1 TABLET, FILM COATED, EXTENDED RELEASE ORAL DAILY
Qty: 10 TABLET | Refills: 0 | Status: SHIPPED | OUTPATIENT
Start: 2023-03-15 | End: 2023-03-15 | Stop reason: SDUPTHER

## 2023-03-15 RX ORDER — CETIRIZINE HYDROCHLORIDE, PSEUDOEPHEDRINE HYDROCHLORIDE 5; 120 MG/1; MG/1
1 TABLET, FILM COATED, EXTENDED RELEASE ORAL DAILY
Qty: 10 TABLET | Refills: 0 | Status: SHIPPED | OUTPATIENT
Start: 2023-03-15 | End: 2023-03-20

## 2023-03-15 RX ADMIN — IBUPROFEN 600 MG: 600 TABLET ORAL at 04:03

## 2023-03-15 NOTE — DISCHARGE INSTRUCTIONS
You have COVID. You must quarantine for the first 5 days of your illness, then you must wear a mask at all times outside your house for the next 5 days.  Return to work based on your work guidelines.  If you have worsening symptoms, return to the ER.      Rest.  Drink plenty of clear fluids. Take ibuprofen 600mg every 6 hours and tylenol 650mg every 6 hours as needed for fever, pain or headache.  Take zyrtec D for runny nose, cough, or postnasal drip and for nasal, sinus or ear congestion.  Take mucinex DM or tessalon perles for cough. Take paxlovid only after reading the attached handout.

## 2023-03-15 NOTE — Clinical Note
"Evan Blancas" Juan A was seen and treated in our emergency department on 3/15/2023.  She may return to work on 03/20/2023.  Must wear a mask through March 24th.     If you have any questions or concerns, please don't hesitate to call.      Farzaneh Vogel MD"

## 2023-03-15 NOTE — ED PROVIDER NOTES
Encounter Date: 3/15/2023    SCRIBE #1 NOTE: I, Jacob Forman, am scribing for, and in the presence of,  Farzaneh Vogel MD.     History     Chief Complaint   Patient presents with    Generalized Body Aches    Nasal Congestion    Sore Throat    Shortness of Breath     Symptoms starting 2 days ago   Tylenol at 0900am      32 y/o female with HTN presents to the ED with fever, chills, sore throat, bilateral ear pain, back pain, generalized myalgias, and cough that all began 2 days ago. She has attempted treatment with acetaminophen. No exacerbating or alleviating factors. She denies any other associated symptoms. She is not a smoker. NKDA.     The history is provided by the patient. No  was used.   Review of patient's allergies indicates:  No Known Allergies  Past Medical History:   Diagnosis Date    Anxiety     Dermatitis     GERD (gastroesophageal reflux disease)     Hypertension     Pruritic disorder      History reviewed. No pertinent surgical history.  Family History   Problem Relation Age of Onset    Hypertension Mother     Eczema Sister      Social History     Tobacco Use    Smoking status: Never    Smokeless tobacco: Never   Substance Use Topics    Alcohol use: Yes     Comment: occ    Drug use: Yes     Types: Marijuana     Comment: occ     Review of Systems   Constitutional:  Positive for chills and fever. Negative for activity change and appetite change.   HENT:  Positive for ear pain and sore throat. Negative for congestion, rhinorrhea and sneezing.    Respiratory:  Positive for cough. Negative for choking, shortness of breath and wheezing.    Cardiovascular:  Negative for chest pain and palpitations.   Gastrointestinal:  Negative for abdominal pain, diarrhea, nausea and vomiting.   Musculoskeletal:  Positive for back pain and myalgias (generalized).   Neurological:  Negative for dizziness, syncope, light-headedness and headaches.   All other systems reviewed and are negative.    Physical Exam      Initial Vitals   BP Pulse Resp Temp SpO2   03/15/23 1430 03/15/23 1429 03/15/23 1429 03/15/23 1429 03/15/23 1429   (!) 154/88 86 20 99 °F (37.2 °C) 98 %      MAP       --                Physical Exam    Nursing note and vitals reviewed.  Constitutional: She appears well-developed and well-nourished. She is Obese . No distress.   HENT:   Head: Normocephalic and atraumatic.   Right Ear: Tympanic membrane normal.   Left Ear: Tympanic membrane normal.   Mouth/Throat: Posterior oropharyngeal erythema present. No oropharyngeal exudate or posterior oropharyngeal edema.   Eyes: Conjunctivae are normal.   Neck:   Normal range of motion.  Cardiovascular:  Normal rate, regular rhythm and normal heart sounds.           No murmur heard.  Pulmonary/Chest: Breath sounds normal. No respiratory distress.   Abdominal: Abdomen is soft. Bowel sounds are normal. She exhibits no distension. There is no abdominal tenderness.   Musculoskeletal:         General: No tenderness or edema. Normal range of motion.      Cervical back: Normal range of motion.     Neurological: She is alert and oriented to person, place, and time. GCS score is 15. GCS eye subscore is 4. GCS verbal subscore is 5. GCS motor subscore is 6.   Skin: Skin is warm and dry.   Psychiatric: She has a normal mood and affect. Her behavior is normal.       ED Course   Procedures  Labs Reviewed   SARS-COV-2 RDRP GENE - Abnormal; Notable for the following components:       Result Value    POC Rapid COVID Positive (*)     All other components within normal limits    Narrative:     This test utilizes isothermal nucleic acid amplification technology to detect the SARS-CoV-2 RdRp nucleic acid segment. The analytical sensitivity (limit of detection) is 500 copies/swab.     A POSITIVE result is indicative of the presence of SARS-CoV-2 RNA; clinical correlation with patient history and other diagnostic information is necessary to determine patient infection status.    A NEGATIVE  result means that SARS-CoV-2 nucleic acids are not present above the limit of detection. A NEGATIVE result should be treated as presumptive. It does not rule out the possibility of COVID-19 and should not be the sole basis for treatment decisions. If COVID-19 is strongly suspected based on clinical and exposure history, re-testing using an alternate molecular assay should be considered.     This test is only for use under the Food and Drug Administration s Emergency Use Authorization (EUA).     Commercial kits are provided by [employer]. Performance characteristics of the EUA have been independently verified by [facility] Department of Pathology and Laboratory Medicine.   _________________________________________________________________   The authorized Fact Sheet for Healthcare Providers and the authorized Fact Sheet for Patients of the ID NOW COVID-19 are available on the FDA website:    https://www.fda.gov/media/820392/download      https://www.fda.gov/media/925615/download      POCT URINE PREGNANCY   POCT CBC   POCT STREP A, RAPID   POCT RAPID INFLUENZA A/B   POCT CMP   POCT CMP          Imaging Results    None          Medications   ibuprofen tablet 600 mg (600 mg Oral Given 3/15/23 1639)     Medical Decision Making:   History:   Old Medical Records: I decided to obtain old medical records.  Clinical Tests:   Lab Tests: Ordered and Reviewed  ED Management:  34 y/o female with HTN presents to the ED with fever, chills, sore throat, bilateral ear pain, back pain, generalized myalgias, and cough that all began 2 days ago. On exam, there is peritonsillar erythema. No fever, no distress. Work up shows +COVID, no flu, no strep, not pregnant. Normal renal function. Pt would like paxlovid and I see no identifiable contraindications. She was prescribed paxlovid, zyrtec D, ibuprofen, motrin, and tessalon perles for her symptoms.         Scribe Attestation:   Scribe #1: I performed the above scribed  service and the documentation accurately describes the services I performed. I attest to the accuracy of the note.            I, Dr. Farzaneh Vogel, personally performed the services described in this documentation.   All medical record entries made by the scribe were at my direction and in my presence.   I have reviewed the chart and agree that the record is accurate and complete.   Farzaneh Vogel MD.  3:20 PM 03/16/2023        Clinical Impression:   Final diagnoses:  [U07.1] COVID-19 virus infection (Primary)        ED Disposition Condition    Discharge Stable          ED Prescriptions       Medication Sig Dispense Start Date End Date Auth. Provider    nirmatrelvir-ritonavir (PAXLOVID, EUA,) 150-100 mg DsPk  (Status: Discontinued) Take 1 tablet by mouth 2 (two) times daily. for 5 days 10 tablet 3/15/2023 3/15/2023 Farzaneh Vogel MD    cetirizine-pseudoephedrine 5-120 mg Tb12  (Status: Discontinued) Take 1 tablet by mouth once daily. for 5 days 10 tablet 3/15/2023 3/15/2023 Farzaneh Vogel MD    ibuprofen (ADVIL,MOTRIN) 600 MG tablet  (Status: Discontinued) Take 1 tablet (600 mg total) by mouth every 6 (six) hours as needed for Pain or Temperature greater than (100,5). 20 tablet 3/15/2023 3/15/2023 Farzaneh Vogel MD    benzonatate (TESSALON) 200 MG capsule  (Status: Discontinued) Take 1 capsule (200 mg total) by mouth 3 (three) times daily as needed for Cough. 15 capsule 3/15/2023 3/15/2023 Farzaneh Vogel MD    benzonatate (TESSALON) 200 MG capsule Take 1 capsule (200 mg total) by mouth 3 (three) times daily as needed for Cough. 15 capsule 3/15/2023 3/20/2023 Farzaneh Vogel MD    cetirizine-pseudoephedrine 5-120 mg Tb12 Take 1 tablet by mouth once daily. for 5 days 10 tablet 3/15/2023 3/20/2023 Farzaneh Vogel MD    ibuprofen (ADVIL,MOTRIN) 600 MG tablet Take 1 tablet (600 mg total) by mouth every 6 (six) hours as needed for Pain or Temperature greater than (100,5). 20 tablet 3/15/2023 -- Farzaneh Vogel MD    nirmatrelvir-ritonavir  (PAXLOVID, EUA,) 150-100 mg DsPk Take 1 tablet by mouth 2 (two) times daily. for 5 days 10 tablet 3/15/2023 3/20/2023 Farzaneh Vogel MD          Follow-up Information       Follow up With Specialties Details Why Contact Info    [facility] ED Emergency Medicine  As needed [address] [facility] [id number] [phone]             Farzaneh Vogel MD  03/16/23 0782

## 2023-04-13 ENCOUNTER — HOSPITAL ENCOUNTER (EMERGENCY)
Facility: HOSPITAL | Age: 33
Discharge: HOME | End: 2023-04-14
Attending: STUDENT IN AN ORGANIZED HEALTH CARE EDUCATION/TRAINING PROGRAM
Payer: MEDICAID

## 2023-04-13 DIAGNOSIS — R42 DIZZINESS: ICD-10-CM

## 2023-04-13 DIAGNOSIS — R09.81 SINUS CONGESTION: ICD-10-CM

## 2023-04-13 DIAGNOSIS — R42 VERTIGO: Primary | ICD-10-CM

## 2023-04-13 LAB
ALBUMIN SERPL BCP-MCNC: 3.5 G/DL (ref 3.5–5.2)
ALP SERPL-CCNC: 94 U/L (ref 55–135)
ALT SERPL W/O P-5'-P-CCNC: 18 U/L (ref 10–44)
ANION GAP SERPL CALC-SCNC: 11 MMOL/L (ref 8–16)
AST SERPL-CCNC: 17 U/L (ref 10–40)
B-HCG UR QL: NEGATIVE
BASOPHILS # BLD AUTO: 0.02 K/UL (ref 0–0.2)
BASOPHILS NFR BLD: 0.2 % (ref 0–1.9)
BILIRUB SERPL-MCNC: 0.3 MG/DL (ref 0.1–1)
BILIRUB UR QL STRIP: NEGATIVE
BUN SERPL-MCNC: 13 MG/DL (ref 6–20)
CALCIUM SERPL-MCNC: 8.4 MG/DL (ref 8.7–10.5)
CHLORIDE SERPL-SCNC: 106 MMOL/L (ref 95–110)
CLARITY UR: CLEAR
CO2 SERPL-SCNC: 22 MMOL/L (ref 23–29)
COLOR UR: YELLOW
CREAT SERPL-MCNC: 0.8 MG/DL (ref 0.5–1.4)
CTP QC/QA: YES
DIFFERENTIAL METHOD: ABNORMAL
EOSINOPHIL # BLD AUTO: 0.1 K/UL (ref 0–0.5)
EOSINOPHIL NFR BLD: 0.5 % (ref 0–8)
ERYTHROCYTE [DISTWIDTH] IN BLOOD BY AUTOMATED COUNT: 15.9 % (ref 11.5–14.5)
EST. GFR  (NO RACE VARIABLE): >60 ML/MIN/1.73 M^2
GLUCOSE SERPL-MCNC: 97 MG/DL (ref 70–110)
GLUCOSE UR QL STRIP: NEGATIVE
HCT VFR BLD AUTO: 36.2 % (ref 37–48.5)
HGB BLD-MCNC: 11 G/DL (ref 12–16)
HGB UR QL STRIP: NEGATIVE
IMM GRANULOCYTES # BLD AUTO: 0.06 K/UL (ref 0–0.04)
IMM GRANULOCYTES NFR BLD AUTO: 0.5 % (ref 0–0.5)
KETONES UR QL STRIP: NEGATIVE
LEUKOCYTE ESTERASE UR QL STRIP: ABNORMAL
LYMPHOCYTES # BLD AUTO: 4.8 K/UL (ref 1–4.8)
LYMPHOCYTES NFR BLD: 37.3 % (ref 18–48)
MAGNESIUM SERPL-MCNC: 2.1 MG/DL (ref 1.6–2.6)
MCH RBC QN AUTO: 22.5 PG (ref 27–31)
MCHC RBC AUTO-ENTMCNC: 30.4 G/DL (ref 32–36)
MCV RBC AUTO: 74 FL (ref 82–98)
MICROSCOPIC COMMENT: NORMAL
MONOCYTES # BLD AUTO: 0.8 K/UL (ref 0.3–1)
MONOCYTES NFR BLD: 6.2 % (ref 4–15)
NEUTROPHILS # BLD AUTO: 7.1 K/UL (ref 1.8–7.7)
NEUTROPHILS NFR BLD: 55.3 % (ref 38–73)
NITRITE UR QL STRIP: NEGATIVE
NRBC BLD-RTO: 0 /100 WBC
PH UR STRIP: 7 [PH] (ref 5–8)
PLATELET # BLD AUTO: 358 K/UL (ref 150–450)
PMV BLD AUTO: 10 FL (ref 9.2–12.9)
POC MOLECULAR INFLUENZA A AGN: NEGATIVE
POC MOLECULAR INFLUENZA B AGN: NEGATIVE
POCT GLUCOSE: 95 MG/DL (ref 70–110)
POTASSIUM SERPL-SCNC: 4.1 MMOL/L (ref 3.5–5.1)
PROT SERPL-MCNC: 8 G/DL (ref 6–8.4)
PROT UR QL STRIP: NEGATIVE
RBC # BLD AUTO: 4.89 M/UL (ref 4–5.4)
RBC #/AREA URNS HPF: 1 /HPF (ref 0–4)
SARS-COV-2 RDRP RESP QL NAA+PROBE: NEGATIVE
SODIUM SERPL-SCNC: 139 MMOL/L (ref 136–145)
SP GR UR STRIP: 1.02 (ref 1–1.03)
SQUAMOUS #/AREA URNS HPF: 3 /HPF
URN SPEC COLLECT METH UR: ABNORMAL
UROBILINOGEN UR STRIP-ACNC: NEGATIVE EU/DL
WBC # BLD AUTO: 12.82 K/UL (ref 3.9–12.7)
WBC #/AREA URNS HPF: 1 /HPF (ref 0–5)

## 2023-04-13 PROCEDURE — 83735 ASSAY OF MAGNESIUM: CPT | Performed by: STUDENT IN AN ORGANIZED HEALTH CARE EDUCATION/TRAINING PROGRAM

## 2023-04-13 PROCEDURE — 25000003 PHARM REV CODE 250: Performed by: STUDENT IN AN ORGANIZED HEALTH CARE EDUCATION/TRAINING PROGRAM

## 2023-04-13 PROCEDURE — 99285 EMERGENCY DEPT VISIT HI MDM: CPT | Mod: 25

## 2023-04-13 PROCEDURE — 93010 ELECTROCARDIOGRAM REPORT: CPT | Mod: ,,, | Performed by: INTERNAL MEDICINE

## 2023-04-13 PROCEDURE — 82962 GLUCOSE BLOOD TEST: CPT

## 2023-04-13 PROCEDURE — 81000 URINALYSIS NONAUTO W/SCOPE: CPT | Performed by: STUDENT IN AN ORGANIZED HEALTH CARE EDUCATION/TRAINING PROGRAM

## 2023-04-13 PROCEDURE — 93005 ELECTROCARDIOGRAM TRACING: CPT

## 2023-04-13 PROCEDURE — 85025 COMPLETE CBC W/AUTO DIFF WBC: CPT | Performed by: STUDENT IN AN ORGANIZED HEALTH CARE EDUCATION/TRAINING PROGRAM

## 2023-04-13 PROCEDURE — 81025 URINE PREGNANCY TEST: CPT | Performed by: STUDENT IN AN ORGANIZED HEALTH CARE EDUCATION/TRAINING PROGRAM

## 2023-04-13 PROCEDURE — 96374 THER/PROPH/DIAG INJ IV PUSH: CPT

## 2023-04-13 PROCEDURE — 80053 COMPREHEN METABOLIC PANEL: CPT | Performed by: STUDENT IN AN ORGANIZED HEALTH CARE EDUCATION/TRAINING PROGRAM

## 2023-04-13 PROCEDURE — 96361 HYDRATE IV INFUSION ADD-ON: CPT

## 2023-04-13 PROCEDURE — 93010 EKG 12-LEAD: ICD-10-PCS | Mod: ,,, | Performed by: INTERNAL MEDICINE

## 2023-04-13 PROCEDURE — 63600175 PHARM REV CODE 636 W HCPCS: Performed by: STUDENT IN AN ORGANIZED HEALTH CARE EDUCATION/TRAINING PROGRAM

## 2023-04-13 PROCEDURE — 87502 INFLUENZA DNA AMP PROBE: CPT

## 2023-04-13 RX ORDER — MECLIZINE HYDROCHLORIDE 25 MG/1
25 TABLET ORAL
Status: COMPLETED | OUTPATIENT
Start: 2023-04-13 | End: 2023-04-13

## 2023-04-13 RX ORDER — PROCHLORPERAZINE EDISYLATE 5 MG/ML
5 INJECTION INTRAMUSCULAR; INTRAVENOUS ONCE
Status: COMPLETED | OUTPATIENT
Start: 2023-04-13 | End: 2023-04-13

## 2023-04-13 RX ORDER — MECLIZINE HYDROCHLORIDE 25 MG/1
25 TABLET ORAL 3 TIMES DAILY PRN
Qty: 20 TABLET | Refills: 0 | Status: SHIPPED | OUTPATIENT
Start: 2023-04-13

## 2023-04-13 RX ADMIN — SODIUM CHLORIDE 1000 ML: 9 INJECTION, SOLUTION INTRAVENOUS at 07:04

## 2023-04-13 RX ADMIN — MECLIZINE HYDROCHLORIDE 25 MG: 25 TABLET ORAL at 07:04

## 2023-04-13 RX ADMIN — PROCHLORPERAZINE EDISYLATE 5 MG: 5 INJECTION INTRAMUSCULAR; INTRAVENOUS at 10:04

## 2023-04-13 NOTE — ED PROVIDER NOTES
Encounter Date: 4/13/2023       History     Chief Complaint   Patient presents with    Dizziness     Pt c/o ha, dizziness, nausea, and high bp of 153/94 at work. Reports complaint with meds     33-year-old female with history of multiple inner ear problems, following with ENT, also with chronic sinus congestion presents for sinus congestion, left ear fullness and intermittent dizziness, frontal headache.  The symptoms have been ongoing for weeks but worsened today so she came in for further evaluation.  She can ambulate but feels dizzy when doing so.  She also feels dizzy when turning her head suddenly to left.    Review of patient's allergies indicates:  No Known Allergies  Past Medical History:   Diagnosis Date    Anxiety     Dermatitis     GERD (gastroesophageal reflux disease)     Hypertension     Pruritic disorder      History reviewed. No pertinent surgical history.  Family History   Problem Relation Age of Onset    Hypertension Mother     Eczema Sister      Social History     Tobacco Use    Smoking status: Never    Smokeless tobacco: Never   Substance Use Topics    Alcohol use: Yes     Comment: occ    Drug use: Yes     Types: Marijuana     Comment: occ     Review of Systems   Constitutional:  Negative for activity change, appetite change, chills, fever and unexpected weight change.   HENT:  Positive for sinus pressure. Negative for dental problem and drooling.    Eyes:  Negative for discharge and itching.   Respiratory:  Negative for cough, chest tightness, shortness of breath, wheezing and stridor.    Cardiovascular:  Negative for chest pain, palpitations and leg swelling.   Gastrointestinal:  Negative for abdominal distention, abdominal pain, diarrhea and nausea.   Genitourinary:  Negative for difficulty urinating, dysuria, frequency and urgency.   Musculoskeletal:  Negative for back pain, gait problem and joint swelling.   Neurological:  Positive for dizziness. Negative for syncope, numbness and headaches.    Psychiatric/Behavioral:  Negative for agitation, behavioral problems and confusion.      Physical Exam     Initial Vitals [04/13/23 1842]   BP Pulse Resp Temp SpO2   (!) 177/101 75 18 97.6 °F (36.4 °C) 99 %      MAP       --         Physical Exam    Nursing note and vitals reviewed.  Constitutional: She appears well-developed and well-nourished. She is not diaphoretic.   HENT:   Head: Normocephalic and atraumatic.   Mouth/Throat: Oropharynx is clear and moist.   Eyes: EOM are normal. Pupils are equal, round, and reactive to light. Right eye exhibits no discharge. Left eye exhibits no discharge.   Neck: No tracheal deviation present.   Normal range of motion.  Cardiovascular:  Normal rate, regular rhythm and intact distal pulses.           Pulmonary/Chest: No respiratory distress. She has no wheezes. She exhibits no tenderness.   Abdominal: Abdomen is soft. She exhibits no distension. There is no abdominal tenderness.   Musculoskeletal:         General: No tenderness or edema. Normal range of motion.      Cervical back: Normal range of motion.     Neurological: She is alert and oriented to person, place, and time. She has normal strength and normal reflexes. No cranial nerve deficit or sensory deficit. Coordination and gait normal. GCS eye subscore is 4. GCS verbal subscore is 5. GCS motor subscore is 6.   Normal finger to nose, heel to shin, rapid alternating movement.    Skin: Skin is warm and dry. No rash noted.   Psychiatric: She has a normal mood and affect. Her behavior is normal. Thought content normal.       ED Course   Procedures  Labs Reviewed   URINALYSIS, REFLEX TO URINE CULTURE - Abnormal; Notable for the following components:       Result Value    Leukocytes, UA Trace (*)     All other components within normal limits    Narrative:     Specimen Source->Urine   COMPREHENSIVE METABOLIC PANEL - Abnormal; Notable for the following components:    CO2 22 (*)     Calcium 8.4 (*)     All other components  within normal limits   CBC W/ AUTO DIFFERENTIAL - Abnormal; Notable for the following components:    WBC 12.82 (*)     Hemoglobin 11.0 (*)     Hematocrit 36.2 (*)     MCV 74 (*)     MCH 22.5 (*)     MCHC 30.4 (*)     RDW 15.9 (*)     Immature Grans (Abs) 0.06 (*)     All other components within normal limits   MAGNESIUM   URINALYSIS MICROSCOPIC    Narrative:     Specimen Source->Urine   POCT URINE PREGNANCY   POCT INFLUENZA A/B MOLECULAR   SARS-COV-2 RDRP GENE   POCT GLUCOSE          Imaging Results              CT Head Without Contrast (Final result)  Result time 04/13/23 20:12:10      Final result by Cherrie Farris MD (04/13/23 20:12:10)                   Impression:      No acute intracranial abnormality detected.      Electronically signed by: Cherrie Farris  Date:    04/13/2023  Time:    20:12               Narrative:    EXAMINATION:  CT OF THE HEAD WITHOUT    CLINICAL HISTORY:  Dizziness, persistent/recurrent, cardiac or vascular cause suspected;    TECHNIQUE:  5 mm unenhanced axial images were obtained from the skull base to the vertex.    COMPARISON:  None.    FINDINGS:  The ventricles, basal cisterns, and cortical sulci are within normal limits for patient's stated age. There is no acute intracranial hemorrhage, territorial infarct or mass effect, or midline shift. The visualized paranasal sinuses and mastoid air cells are clear.                                       Medications   meclizine tablet 25 mg (25 mg Oral Given 4/13/23 1923)   sodium chloride 0.9% bolus 1,000 mL 1,000 mL (0 mLs Intravenous Stopped 4/13/23 2022)   prochlorperazine injection Soln 5 mg (5 mg Intravenous Given 4/13/23 2220)                            Given history of vertiginous symptoms worse with eye movement, worse with head movement and exam without focal neurological deficits, the patient's symptoms are most likely due to peripheral cause of vertigo, most likely related to her chronic sinusitis and left ear fullness.  CT  head without acute intracranial abnormality.  Labs within normal limits.  There are no accompanying signs of focal weakness, sensory deficits, or cerebellar symptoms consistent with acute CVA, and no further neuroimaging is indicated or likely to be of benefit at this time. There is no associated headache to suggest SAH. There is no meningismus, fever, or evidence of infection to suggest meningitis/encephalitis. The patient was treated with supportive care  and improved.  Stable for discharge with outpatient follow-up and return precautions.    Clinical Impression:   Final diagnoses:  [R42] Dizziness  [R42] Vertigo (Primary)  [R09.81] Sinus congestion        ED Disposition Condition    Discharge Stable          ED Prescriptions       Medication Sig Dispense Start Date End Date Auth. Provider    meclizine (ANTIVERT) 25 mg tablet Take 1 tablet (25 mg total) by mouth 3 (three) times daily as needed. 20 tablet 4/13/2023 -- Josemanuel Story MD          Follow-up Information       Follow up With Specialties Details Why Contact Info    Irvin Nur MD Family Medicine Call in 1 day To set up a follow-up appointment, To recheck today's symptoms [id number] [facility] [address] [phone]               Josemanuel Story MD  04/14/23 4563

## 2023-04-14 VITALS
DIASTOLIC BLOOD PRESSURE: 84 MMHG | SYSTOLIC BLOOD PRESSURE: 160 MMHG | HEART RATE: 74 BPM | HEIGHT: 70 IN | BODY MASS INDEX: 41.95 KG/M2 | RESPIRATION RATE: 16 BRPM | TEMPERATURE: 98 F | WEIGHT: 293 LBS | OXYGEN SATURATION: 97 %

## 2023-04-14 NOTE — DISCHARGE INSTRUCTIONS

## 2023-04-14 NOTE — ED TRIAGE NOTES
"Pt arrives to the ED c/o dizziness, nausea, and head pressure that began today while at work. Pt states she believes the symptoms are related to her eating "junk food at work which caused my pressure to go up." Pt has a hx of HTN and reports med compliance. Pt denies any LOC, abd pain, vomiting or diarrhea but endorses decreased fluid intake, orbital pressure and nasal congestion. Pt AAOx4, placed on cardiac monitor and EKG obtained.  "

## 2023-04-17 ENCOUNTER — PATIENT OUTREACH (OUTPATIENT)
Dept: EMERGENCY MEDICINE | Facility: HOSPITAL | Age: 33
End: 2023-04-17
Payer: MEDICAID

## 2023-04-17 NOTE — PROGRESS NOTES
Keya Dee  ED Navigator  Emergency Department    Project: [facility] [place]  Role: Community Health Worker    Date: 04/17/2023  Patient Name: Evan Velazco  MRN: [id number]  PCP: Irvin Nur MD    Assessment:     Evan Velazco is a 33 y.o. female who has presented to ED for dizziness. Patient has visited the ED 3 times in the past 3 months. Patient did contact PCP.     ED Navigator Initial Assessment    ED Navigator Enrollment Documentation  Consent to Services  Does patient consent to completing the assessment?: Yes  Contact  Method of Initial Contact: Phone  Transportation  Does the patient have issues with Transportation?: No  Does the patient have transportation to and from healthcare appointments?: Yes  Insurance Coverage  Do you have coverage/adequate coverage?: Yes  Type/kind of coverage: Primary Cvg:  Medicaid/[employer] [facility] ([place] Medicaid)  Is patient able to afford co-pays/deductibles?: Yes  Is patient able to afford HME or supplies?: Yes  Does patient have an established [facility] PCP?: Yes  Able to access?: Yes  Does the patient have a lack of adequate coverage?: No  Specialist Appointment  Did the patient come to the ED to see a specialist?: No  Does the patient have a pending specialist referral?: No  Does the patient have a specialist appointment made?: No  PCP Follow Up Appointment  Has the patient had an appointment with a primary care provider in the past year?: Yes  Approximate date: 10/25/22  Provider: Taj Francisco Jr., MD  Does the patient have a follow up appontment with a PCP?: No  When was the last time you saw your PCP?: 10/25/22  Why does the patient not have a follow up scheduled?: Inconvenient appointment times  Medications  Is patient able to afford medication?: Yes  Is patient unable to get medication due to lack of transportation?: No  Psychological  Does the patient have psycho-social concerns?: Yes  What concerns does the patient have?: Anxiety and/or  Depression  Food  Does the patient have concerns about food?: Yes  What concerns does the patient have regarding food?: Lack of food  Communication/Education  Does the patient have limited English proficiency/English not primary language?: No  Does patient have low literacy and/or low health literacy?: Yes  Does patient have concerns with care?: No  Does patient have dissatisfaction with care?: No  Other Financial Concerns  Does the patient have immediate financial distress?: No  Does the patient have general financial concerns?: No  Other Social Barriers/Concerns  Does the patient have any additional barriers or concerns?: Unable to afford utilities  Primary Barrier  Barriers identified: Cognitive barrier (health literacy, language and communication, etc.)  Root Cause of ED Utilization: Patient Knowledge/Low Health Literacy  Plan to address Patient Knowledge/Low Health Literacy: Provided information for [facility] On [facility] 24/7 Nurse triage line [phone] or 1-866-[facility] ([phone])  Next steps: Provided Education  Was education/educational materials provided surrounding PCP services/creating a medical home?: Yes Was education verbal or written?: Verbal     Was education/educational materials provided surrounding low cost, healthy foods?: Yes Was education verbal or written?: Written (Comment: Healthy Eating and food resources sent via e-mail)     Was education/educational materials provided surrounding other items? If so, use comment to explain.: Yes Was education verbal or written?: Written (Comment: Dental resource)   Plan: The patient was given Dental Resources for their region.  Expected Date of Follow Up 1: 5/22/23  Additional Documentation: ED Navigator spoke with patient regarding her recent ED visit.Patient stated she is feeling a little better. Patient has an upcoming follow-up with her PCP. Assessment completed. Patient requested resources for rental assistance, utility assistance, food and Dentist  list. In addition to the requested resources, Right Care Right Place form, [place] Virtual Visit Flyer, [facility] PCP scheduling assistance, [facility] on call RN#, and Heart Healthy Diet education were sent via verified e-mail.  Keya Dee          Social History     Socioeconomic History    Marital status: Single   Occupational History    Occupation:    Tobacco Use    Smoking status: Never    Smokeless tobacco: Never   Substance and Sexual Activity    Alcohol use: Yes     Comment: occ    Drug use: Yes     Types: Marijuana     Comment: occ    Sexual activity: Yes     Partners: Male     Birth control/protection: None     Social Determinants of Health     Social Connections: Unknown    Frequency of Communication with Friends and Family: More than three times a week    Frequency of Social Gatherings with Friends and Family: More than three times a week    Attends Alevism Services: 1 to 4 times per year    Active Member of Clubs or Organizations: No    Attends Club or Organization Meetings: Never       Plan:   ED Navigator spoke with patient regarding her recent ED visit.Patient stated she is feeling a little better. Patient has an upcoming follow-up with her PCP. Assessment completed. Patient requested resources for rental assistance, utility assistance, food and Dentist list. In addition to the requested resources, Right Care Right Place form, [place] Virtual Visit Flyer, [facility] PCP scheduling assistance, [facility] on call RN#, and Heart Healthy Diet education were sent via verified e-mail.  Keya Dee       Appointment made with: Irvin Nur MD

## 2023-06-10 ENCOUNTER — HOSPITAL ENCOUNTER (EMERGENCY)
Facility: HOSPITAL | Age: 33
Discharge: HOME | End: 2023-06-10
Attending: EMERGENCY MEDICINE
Payer: MEDICAID

## 2023-06-10 VITALS
OXYGEN SATURATION: 100 % | RESPIRATION RATE: 20 BRPM | TEMPERATURE: 99 F | WEIGHT: 293 LBS | DIASTOLIC BLOOD PRESSURE: 104 MMHG | HEART RATE: 80 BPM | SYSTOLIC BLOOD PRESSURE: 161 MMHG | BODY MASS INDEX: 50.65 KG/M2

## 2023-06-10 DIAGNOSIS — R45.89 ANXIETY ABOUT HEALTH: Primary | ICD-10-CM

## 2023-06-10 DIAGNOSIS — R07.9 CHEST PAIN: ICD-10-CM

## 2023-06-10 LAB
ALBUMIN SERPL-MCNC: 4 G/DL (ref 3.3–5.5)
ALBUMIN SERPL-MCNC: 4.4 G/DL (ref 3.3–5.5)
ALP SERPL-CCNC: 84 U/L (ref 42–141)
ALP SERPL-CCNC: 86 U/L (ref 42–141)
B-HCG UR QL: NEGATIVE
BILIRUB SERPL-MCNC: 0.7 MG/DL (ref 0.2–1.6)
BILIRUB SERPL-MCNC: ABNORMAL MG/DL (ref 0.2–1.6)
BUN SERPL-MCNC: 8 MG/DL (ref 7–22)
BUN SERPL-MCNC: 8 MG/DL (ref 7–22)
CALCIUM SERPL-MCNC: 10.4 MG/DL (ref 8–10.3)
CALCIUM SERPL-MCNC: 10.9 MG/DL (ref 8–10.3)
CHLORIDE SERPL-SCNC: 100 MMOL/L (ref 98–108)
CHLORIDE SERPL-SCNC: 96 MMOL/L (ref 98–108)
CREAT SERPL-MCNC: 0.8 MG/DL (ref 0.6–1.2)
CREAT SERPL-MCNC: ABNORMAL MG/DL (ref 0.6–1.2)
CTP QC/QA: YES
GLUCOSE SERPL-MCNC: 104 MG/DL (ref 73–118)
GLUCOSE SERPL-MCNC: 108 MG/DL (ref 73–118)
POC ALT (SGPT): 21 U/L (ref 10–47)
POC ALT (SGPT): 29 U/L (ref 10–47)
POC AST (SGOT): 32 U/L (ref 11–38)
POC AST (SGOT): ABNORMAL U/L (ref 11–38)
POC B-TYPE NATRIURETIC PEPTIDE: <5 PG/ML (ref 0–100)
POC BETA-HCG (QUANT): <5 IU/L
POC CARDIAC TROPONIN I: 0 NG/ML (ref 0–0.08)
POC PTINR: 1.1 (ref 0.9–1.2)
POC PTWBT: 13.3 SEC (ref 9.7–14.3)
POC TCO2: 20 MMOL/L (ref 18–33)
POC TCO2: 25 MMOL/L (ref 18–33)
POTASSIUM BLD-SCNC: 4 MMOL/L (ref 3.6–5.1)
POTASSIUM BLD-SCNC: ABNORMAL MMOL/L (ref 3.6–5.1)
PROTEIN, POC: 8.7 G/DL (ref 6.4–8.1)
PROTEIN, POC: 8.8 G/DL (ref 6.4–8.1)
SAMPLE: NORMAL
SODIUM BLD-SCNC: 138 MMOL/L (ref 128–145)
SODIUM BLD-SCNC: ABNORMAL MMOL/L (ref 128–145)

## 2023-06-10 PROCEDURE — 93010 ELECTROCARDIOGRAM REPORT: CPT | Mod: ,,, | Performed by: INTERNAL MEDICINE

## 2023-06-10 PROCEDURE — 99284 EMERGENCY DEPT VISIT MOD MDM: CPT | Mod: 25,ER

## 2023-06-10 PROCEDURE — 85610 PROTHROMBIN TIME: CPT | Mod: ER

## 2023-06-10 PROCEDURE — 81025 URINE PREGNANCY TEST: CPT | Mod: ER

## 2023-06-10 PROCEDURE — 84484 ASSAY OF TROPONIN QUANT: CPT | Mod: ER

## 2023-06-10 PROCEDURE — 93010 EKG 12-LEAD: ICD-10-PCS | Mod: ,,, | Performed by: INTERNAL MEDICINE

## 2023-06-10 PROCEDURE — 25000242 PHARM REV CODE 250 ALT 637 W/ HCPCS: Mod: ER | Performed by: EMERGENCY MEDICINE

## 2023-06-10 PROCEDURE — 93005 ELECTROCARDIOGRAM TRACING: CPT | Mod: ER

## 2023-06-10 PROCEDURE — 85025 COMPLETE CBC W/AUTO DIFF WBC: CPT | Mod: ER

## 2023-06-10 PROCEDURE — 25000003 PHARM REV CODE 250: Mod: ER | Performed by: EMERGENCY MEDICINE

## 2023-06-10 PROCEDURE — 80053 COMPREHEN METABOLIC PANEL: CPT | Mod: ER

## 2023-06-10 PROCEDURE — 84702 CHORIONIC GONADOTROPIN TEST: CPT | Mod: ER

## 2023-06-10 PROCEDURE — 83880 ASSAY OF NATRIURETIC PEPTIDE: CPT | Mod: ER

## 2023-06-10 RX ORDER — NAPROXEN SODIUM 220 MG/1
81 TABLET, FILM COATED ORAL DAILY
Qty: 30 TABLET | Refills: 0 | Status: SHIPPED | OUTPATIENT
Start: 2023-06-10 | End: 2024-06-09

## 2023-06-10 RX ORDER — HYDROXYZINE PAMOATE 25 MG/1
25 CAPSULE ORAL
Status: COMPLETED | OUTPATIENT
Start: 2023-06-10 | End: 2023-06-10

## 2023-06-10 RX ORDER — ASPIRIN 325 MG
325 TABLET ORAL
Status: COMPLETED | OUTPATIENT
Start: 2023-06-10 | End: 2023-06-10

## 2023-06-10 RX ORDER — HYDROXYZINE HYDROCHLORIDE 25 MG/1
25 TABLET, FILM COATED ORAL 3 TIMES DAILY PRN
Qty: 6 TABLET | Refills: 0 | Status: SHIPPED | OUTPATIENT
Start: 2023-06-10 | End: 2023-06-10

## 2023-06-10 RX ORDER — NITROGLYCERIN 0.4 MG/1
0.4 TABLET SUBLINGUAL EVERY 5 MIN PRN
Status: DISCONTINUED | OUTPATIENT
Start: 2023-06-10 | End: 2023-06-10 | Stop reason: HOSPADM

## 2023-06-10 RX ORDER — NITROGLYCERIN 0.4 MG/1
0.4 TABLET SUBLINGUAL
Status: COMPLETED | OUTPATIENT
Start: 2023-06-10 | End: 2023-06-10

## 2023-06-10 RX ORDER — LORAZEPAM 0.5 MG/1
1 TABLET ORAL
Status: COMPLETED | OUTPATIENT
Start: 2023-06-10 | End: 2023-06-10

## 2023-06-10 RX ORDER — LORAZEPAM 0.5 MG/1
0.5 TABLET ORAL
Status: COMPLETED | OUTPATIENT
Start: 2023-06-10 | End: 2023-06-10

## 2023-06-10 RX ORDER — CITALOPRAM 10 MG/1
10 TABLET ORAL DAILY
COMMUNITY

## 2023-06-10 RX ADMIN — ASPIRIN 325 MG ORAL TABLET 325 MG: 325 PILL ORAL at 12:06

## 2023-06-10 RX ADMIN — LORAZEPAM 0.5 MG: 0.5 TABLET ORAL at 03:06

## 2023-06-10 RX ADMIN — HYDROXYZINE PAMOATE 25 MG: 25 CAPSULE ORAL at 12:06

## 2023-06-10 RX ADMIN — LORAZEPAM 0.5 MG: 0.5 TABLET ORAL at 02:06

## 2023-06-10 RX ADMIN — NITROGLYCERIN 0.4 MG: 0.4 TABLET, ORALLY DISINTEGRATING SUBLINGUAL at 12:06

## 2023-06-10 NOTE — Clinical Note
"Evan Blancas" Juan A was seen and treated in our emergency department on 6/10/2023.  She may return to work on 06/12/2023.       If you have any questions or concerns, please don't hesitate to call.      Nimo Drew, DO"

## 2023-06-10 NOTE — ED NOTES
Pt presents to ed with c/o L sided chest pain, SOB, and anxiety. Pt reports having episodes of panic attacks. Cardiac monitor placed on patient. Sinus tach present. Denies blurry vision, resp even and unlabored. NAD noted. Airway intact. Labs sent. Waiting results. Will cont to monitor.

## 2023-06-10 NOTE — ED PROVIDER NOTES
"Encounter Date: 6/10/2023    SCRIBE #1 NOTE: I, Nereidadeandre Mtz, am scribing for, and in the presence of,  Nimo Drew DO. I have scribed the following portions of the note - Other sections scribed: HPI, ROS, PE.     History     Chief Complaint   Patient presents with    Chest Pain     Evan Velazco, a 33 y.o. female presents to the ED via PV with CC of chest pain and posterior neck pain onset this morning. Pt describes the pain and L sided and pressure. Pt states "my heart is palpitating".         Evan Velazco 33 y.o. female, with PMHx of HTN, GERD, and Anxiety, presents to the ED with chest pain onset 2 days ago. Patient reports palpitations for years. Decreased appetite and is trying to lose weight.  Feeling a lot of anxiety. Patient states that her PCP took her off of Ativan and prescribed Celexa 4 days ago. Patient's last dose of Ativan was 4 days ago. Patient reports being recently diagnosed with an enlarged heart by primary care physician. Patient denies any other associated symptoms. Patient reports taking Omeprazole, Amlodipine, and Hydrochlorothiazide daily. Patient has NKDA.    The history is provided by the patient. No  was used.   Review of patient's allergies indicates:  No Known Allergies  Past Medical History:   Diagnosis Date    Anxiety     Dermatitis     GERD (gastroesophageal reflux disease)     Hypertension     Pruritic disorder      No past surgical history on file.  Family History   Problem Relation Age of Onset    Hypertension Mother     Eczema Sister      Social History     Tobacco Use    Smoking status: Never    Smokeless tobacco: Never   Substance Use Topics    Alcohol use: Yes     Comment: occ    Drug use: Yes     Types: Marijuana     Comment: occ     Review of Systems   Constitutional:  Positive for appetite change. Negative for fever.   HENT:  Negative for rhinorrhea and sore throat.    Eyes:  Negative for redness.   Respiratory:  Negative for shortness of breath.  "   Cardiovascular:  Positive for chest pain and palpitations. Negative for leg swelling.   Gastrointestinal:  Negative for abdominal pain, diarrhea, nausea and vomiting.   Musculoskeletal:  Negative for back pain.   Skin:  Negative for rash.   Neurological:  Negative for syncope and headaches.   Psychiatric/Behavioral:  Negative for self-injury and suicidal ideas. The patient is nervous/anxious.    All other systems reviewed and are negative.    Physical Exam     Initial Vitals [06/10/23 1225]   BP Pulse Resp Temp SpO2   (!) 163/65 98 20 99.3 °F (37.4 °C) 95 %      MAP       --         Patient gave consent to have physical exam performed.   Physical Exam    Nursing note and vitals reviewed.  Constitutional: She appears well-developed and well-nourished.   HENT:   Head: Normocephalic and atraumatic.   Right Ear: External ear normal.   Left Ear: External ear normal.   Nose: Nose normal.   Mouth/Throat: Oropharynx is clear and moist.   Eyes: Conjunctivae and EOM are normal. Pupils are equal, round, and reactive to light.   Neck: Phonation normal. Neck supple.   Normal range of motion.  Cardiovascular:  Normal rate, regular rhythm, normal heart sounds and intact distal pulses.     Exam reveals no gallop and no friction rub.       No murmur heard.  Pulmonary/Chest: Effort normal and breath sounds normal. No stridor. No respiratory distress. She has no wheezes. She has no rhonchi. She has no rales. She exhibits no tenderness.   Abdominal: Abdomen is soft. Bowel sounds are normal. She exhibits no distension. There is no abdominal tenderness. There is no rigidity, no rebound and no guarding.   Musculoskeletal:         General: No tenderness or edema. Normal range of motion.      Cervical back: Normal range of motion and neck supple.     Neurological: She is alert and oriented to person, place, and time. She has normal strength. No cranial nerve deficit or sensory deficit. GCS score is 15. GCS eye subscore is 4. GCS verbal  subscore is 5. GCS motor subscore is 6.   Skin: Skin is warm and dry. Capillary refill takes less than 2 seconds. No rash noted.   Psychiatric: Her behavior is normal. Her mood appears anxious. She expresses no homicidal and no suicidal ideation.       ED Course   Procedures  Labs Reviewed   POCT CMP - Abnormal; Notable for the following components:       Result Value    Calcium, POC 10.4 (*)     Protein, POC 8.8 (*)     All other components within normal limits   POCT CMP - Abnormal; Notable for the following components:    Calcium, POC 10.9 (*)     POC Chloride 96 (*)     Protein, POC 8.7 (*)     All other components within normal limits   TROPONIN ISTAT   POCT URINE PREGNANCY   POCT CBC   POCT CMP   POCT PROTIME-INR   POCT TROPONIN   POCT B-TYPE NATRIURETIC PEPTIDE (BNP)   ISTAT PROCEDURE   POCT B-HCG (QUANT)   POCT B-TYPE NATRIURETIC PEPTIDE (BNP)   POCT B-HCG (QUANT)          Imaging Results              X-Ray Chest PA And Lateral (Final result)  Result time 06/10/23 14:34:58      Final result by Jared Bean MD (06/10/23 14:34:58)                   Impression:      No acute abnormality.      Electronically signed by: Jared Bean MD  Date:    06/10/2023  Time:    14:34               Narrative:    EXAMINATION:  XR CHEST PA AND LATERAL    CLINICAL HISTORY:  Chest Pain;    TECHNIQUE:  PA and lateral views of the chest were performed.    COMPARISON:  Prior exams dating back to 2009    FINDINGS:  Lungs are clear.  No effusion or pneumothorax.    No acute bone abnormality.                                       Medications   aspirin tablet 325 mg (325 mg Oral Given 6/10/23 1255)   nitroGLYCERIN SL tablet 0.4 mg (0.4 mg Sublingual Given 6/10/23 1257)   hydrOXYzine pamoate capsule 25 mg (25 mg Oral Given 6/10/23 1255)   LORazepam tablet 1 mg (0.5 mg Oral Given 6/10/23 1416)   LORazepam tablet 0.5 mg (0.5 mg Oral Given 6/10/23 1502)     Medical Decision Making:   History:   Old Medical Records: I decided to  "obtain old medical records.  Clinical Tests:   Lab Tests: Ordered and Reviewed  Radiological Study: Ordered and Reviewed  Medical Tests: Ordered and Reviewed  Additional MDM:   Heart Score:    History:          Slightly suspicious.  ECG:             Normal  Age:               Less than 45 years  Risk factors: >= 3 risk factors or history of atherosclerotic disease  Troponin:       Less than or equal to normal limit  Final Score: 2       Medical Decision Making:    This is an evaluation of a 33 y.o. female that presents to the Emergency Department for   Chief Complaint   Patient presents with    Chest Pain     Evan Velazco, a 33 y.o. female presents to the ED via PV with CC of chest pain and posterior neck pain onset this morning. Pt describes the pain and L sided and pressure. Pt states "my heart is palpitating".           The patient is a non-toxic and well appearing patient. On physical exam, patient appears well hydrated with moist mucus membranes. Breath sounds are clear and equal bilaterally with no adventitious breath sounds, tachypnea or respiratory distress. Regular rate and rhythm. No murmurs. Abdomen soft and non tender. Patient is tolerating PO without difficulty.  Vital Signs Are Reassuring.     Based on the patient's symptoms, I am considering and evaluating for the following differential diagnoses: Pregnancy, Anxiety, Anxiety attack, STEMI, NSTEMI, Cardiac arrhythmia.    ED Course:Treatment in the ED included Physical Exam and medications given in ED  Medications   aspirin tablet 325 mg (325 mg Oral Given 6/10/23 1255)   nitroGLYCERIN SL tablet 0.4 mg (0.4 mg Sublingual Given 6/10/23 1257)   hydrOXYzine pamoate capsule 25 mg (25 mg Oral Given 6/10/23 1255)   LORazepam tablet 1 mg (0.5 mg Oral Given 6/10/23 1416)   LORazepam tablet 0.5 mg (0.5 mg Oral Given 6/10/23 1502)   .   Patient reports feeling better after treatment in the ER.     External Data/Documents Reviewed:   Labs: ordered and reviewed. " Decision-making details documented in ED Course.   Radiology: ordered as indicated and reviewed. Decision-making details documented in ED Course.   Patient presents with chest pain for greater than 24 hours.  Troponin is negative.  No STEMI on EKG and no acute issues on chest x-ray. Chest pain unlikely to be cardiac in origin.  I recommend follow up with Cardiology in 1 day for further evaluation of chest pain. Discussed need to return to the ED if chest pain worsens or does not resolve.    Risk  Diagnosis or treatment significantly limited by the following social determinants of health: Body mass index is 50.65 kg/m².     In shared decision making with the patient, we discussed treatment, prescriptions, labs, and imaging results. Patient presents with chest pain for greater than 24 hours.  Troponin is negative.  No STEMI on EKG and no acute issues on chest x-ray. Chest pain unlikely to be cardiac in origin.  I recommend follow up with Cardiology in 1 day for further evaluation of chest pain. Discussed need to return to the ED if chest pain worsens or does not resolve.      Discharge home with   ED Prescriptions       Medication Sig Dispense Start Date End Date Auth. Provider    hydrOXYzine HCL (ATARAX) 25 MG tablet  (Status: Discontinued) Take 1 tablet (25 mg total) by mouth 3 (three) times daily as needed for Anxiety. 6 tablet 6/10/2023 6/10/2023 Nimo Drew, DO    aspirin 81 MG Chew Take 1 tablet (81 mg total) by mouth once daily. 30 tablet 6/10/2023 6/9/2024 Nimo Drew DO          Fill and take prescriptions as directed.  Return to the ED if symptoms worsen or do not resolve.   Answered questions and discussed discharge plan.    Patient feels better and is ready for discharge.  Follow up with PCP/specialist in 1 day    The following labs and imaging were reviewed:        Admission on 06/10/2023, Discharged on 06/10/2023   Component Date Value Ref Range Status    POC Preg Test, Ur 06/10/2023 Negative  Negative  Final     Acceptable 06/10/2023 Yes   Final    POC PTWBT 06/10/2023 13.3  9.7 - 14.3 sec Final    POC PTINR 06/10/2023 1.1  0.9 - 1.2 Final    Sample 06/10/2023 unknown   Final    POC Cardiac Troponin I 06/10/2023 0.00  0.00 - 0.08 ng/mL Final    Sample 06/10/2023 unknown   Final    Comment: A single negative troponin is insufficient to rule out myocardial infarction.  The use of a serial sampling protocol is recommended practice. Correlate results with reference intervals established for methodology used. Point of care and core laboratory   troponin results are not interchangeable.      POC B-Type Natriuretic Peptide 06/10/2023 <5.0  0.0 - 100.0 pg/mL Final    POC Beta-HCG (Quant) 06/10/2023 <5.0  IU/L Final    Sample 06/10/2023 unknown   Final    Albumin, POC 06/10/2023 4.0  3.3 - 5.5 g/dL Final    Alkaline Phosphatase, POC 06/10/2023 84  42 - 141 U/L Final    ALT (SGPT), POC 06/10/2023 21  10 - 47 U/L Final    AST (SGOT), POC 06/10/2023 32  11 - 38 U/L Final    POC BUN 06/10/2023 8  7 - 22 mg/dL Final    Calcium, POC 06/10/2023 10.4 (H)  8.0 - 10.3 mg/dL Final    POC Chloride 06/10/2023 100  98 - 108 mmol/L Final    POC Creatinine 06/10/2023 0.8  0.6 - 1.2 mg/dL Final    POC Glucose 06/10/2023 104  73 - 118 mg/dL Final    POC Potassium 06/10/2023 4.0  3.6 - 5.1 mmol/L Final    POC Sodium 06/10/2023 138  128 - 145 mmol/L Final    Bilirubin, POC 06/10/2023 0.7  0.2 - 1.6 mg/dL Final    POC TCO2 06/10/2023 25  18 - 33 mmol/L Final    Protein, POC 06/10/2023 8.8 (H)  6.4 - 8.1 g/dL Final    Albumin, POC 06/10/2023 4.4  3.3 - 5.5 g/dL Final    Alkaline Phosphatase, POC 06/10/2023 86  42 - 141 U/L Final    ALT (SGPT), POC 06/10/2023 29  10 - 47 U/L Final    AST (SGOT), POC 06/10/2023 HEM  11 - 38 U/L Final    POC BUN 06/10/2023 8  7 - 22 mg/dL Final    Calcium, POC 06/10/2023 10.9 (H)  8.0 - 10.3 mg/dL Final    POC Chloride 06/10/2023 96 (L)  98 - 108 mmol/L Final    POC Creatinine 06/10/2023 HEM  0.6 -  1.2 mg/dL Final    POC Glucose 06/10/2023 108  73 - 118 mg/dL Final    POC Potassium 06/10/2023 HEM  3.6 - 5.1 mmol/L Final    Bilirubin, POC 06/10/2023 HEM  0.2 - 1.6 mg/dL Final    POC TCO2 06/10/2023 20  18 - 33 mmol/L Final    Protein, POC 06/10/2023 8.7 (H)  6.4 - 8.1 g/dL Final        Imaging Results              X-Ray Chest PA And Lateral (Final result)  Result time 06/10/23 14:34:58      Final result by Jared Bean MD (06/10/23 14:34:58)                   Impression:      No acute abnormality.      Electronically signed by: Jared Bean MD  Date:    06/10/2023  Time:    14:34               Narrative:    EXAMINATION:  XR CHEST PA AND LATERAL    CLINICAL HISTORY:  Chest Pain;    TECHNIQUE:  PA and lateral views of the chest were performed.    COMPARISON:  Prior exams dating back to 2009    FINDINGS:  Lungs are clear.  No effusion or pneumothorax.    No acute bone abnormality.                                       Scribe Attestation:   Scribe #1: I performed the above scribed service and the documentation accurately describes the services I performed. I attest to the accuracy of the note.      ED Course as of 06/11/23 0737   Sat Jacky 10, 2023   1351 No STEMI. Rate of 87. Left Axis. Abnormal EKG. LVH appreciated. QTc normal at 457. When compared to prior EKG dated 04/13/23 rate increased by 23 bpm.   [ST]      ED Course User Index  [ST] Nereida Mtz                I, Dr. Nimo Drew, personally performed the services described in this documentation. This document was produced by a scribe under my direction and in my presence. All medical record entries made by the scribe were at my direction and in my presence.  I have reviewed the chart and agree that the record reflects my personal performance and is accurate and complete. Nimo Derw, .     06/11/2023 3:09 PM    Clinical Impression:   Final diagnoses:  [R07.9] Chest pain  [F41.8] Anxiety about health (Primary)        ED Disposition  Condition    Discharge Stable          ED Prescriptions       Medication Sig Dispense Start Date End Date Auth. Provider    hydrOXYzine HCL (ATARAX) 25 MG tablet  (Status: Discontinued) Take 1 tablet (25 mg total) by mouth 3 (three) times daily as needed for Anxiety. 6 tablet 6/10/2023 6/10/2023 Nimo Drew DO    aspirin 81 MG Chew Take 1 tablet (81 mg total) by mouth once daily. 30 tablet 6/10/2023 6/9/2024 Nimo Drew DO          Follow-up Information       Follow up With Specialties Details Why Contact Info    Irvin Nur MD Family Medicine Schedule an appointment as soon as possible for a visit in 1 day  [address] [facility] [address] [phone]      Ori Bose MD Cardiology Schedule an appointment as soon as possible for a visit  For further evaluation of chest pain [address] [facility] [address]  [phone]               Nimo Drew DO  06/11/23 0737